# Patient Record
Sex: MALE | Race: WHITE | NOT HISPANIC OR LATINO
[De-identification: names, ages, dates, MRNs, and addresses within clinical notes are randomized per-mention and may not be internally consistent; named-entity substitution may affect disease eponyms.]

---

## 2017-01-12 ENCOUNTER — APPOINTMENT (OUTPATIENT)
Dept: CARDIOLOGY | Facility: CLINIC | Age: 57
End: 2017-01-12

## 2017-01-17 ENCOUNTER — APPOINTMENT (OUTPATIENT)
Dept: CARDIOLOGY | Facility: CLINIC | Age: 57
End: 2017-01-17

## 2017-05-16 ENCOUNTER — APPOINTMENT (OUTPATIENT)
Dept: CARDIOLOGY | Facility: CLINIC | Age: 57
End: 2017-05-16

## 2017-07-18 ENCOUNTER — APPOINTMENT (OUTPATIENT)
Dept: CARDIOLOGY | Facility: CLINIC | Age: 57
End: 2017-07-18

## 2017-11-09 ENCOUNTER — MEDICATION RENEWAL (OUTPATIENT)
Age: 57
End: 2017-11-09

## 2017-11-14 ENCOUNTER — MEDICATION RENEWAL (OUTPATIENT)
Age: 57
End: 2017-11-14

## 2017-11-15 ENCOUNTER — OTHER (OUTPATIENT)
Age: 57
End: 2017-11-15

## 2017-11-17 ENCOUNTER — RX RENEWAL (OUTPATIENT)
Age: 57
End: 2017-11-17

## 2017-12-01 ENCOUNTER — RX RENEWAL (OUTPATIENT)
Age: 57
End: 2017-12-01

## 2017-12-06 ENCOUNTER — APPOINTMENT (OUTPATIENT)
Dept: CARDIOLOGY | Facility: CLINIC | Age: 57
End: 2017-12-06
Payer: COMMERCIAL

## 2017-12-06 VITALS
RESPIRATION RATE: 18 BRPM | BODY MASS INDEX: 30.52 KG/M2 | WEIGHT: 218 LBS | DIASTOLIC BLOOD PRESSURE: 80 MMHG | SYSTOLIC BLOOD PRESSURE: 120 MMHG | HEART RATE: 84 BPM | OXYGEN SATURATION: 98 % | HEIGHT: 71 IN

## 2017-12-06 PROCEDURE — 99214 OFFICE O/P EST MOD 30 MIN: CPT

## 2018-01-17 ENCOUNTER — NON-APPOINTMENT (OUTPATIENT)
Age: 58
End: 2018-01-17

## 2018-01-17 ENCOUNTER — APPOINTMENT (OUTPATIENT)
Dept: CARDIOLOGY | Facility: CLINIC | Age: 58
End: 2018-01-17
Payer: COMMERCIAL

## 2018-01-17 VITALS
WEIGHT: 228 LBS | HEART RATE: 92 BPM | DIASTOLIC BLOOD PRESSURE: 78 MMHG | BODY MASS INDEX: 31.92 KG/M2 | SYSTOLIC BLOOD PRESSURE: 118 MMHG | HEIGHT: 71 IN

## 2018-01-17 PROCEDURE — 93000 ELECTROCARDIOGRAM COMPLETE: CPT

## 2018-01-17 PROCEDURE — 99214 OFFICE O/P EST MOD 30 MIN: CPT

## 2018-01-25 ENCOUNTER — RX RENEWAL (OUTPATIENT)
Age: 58
End: 2018-01-25

## 2018-01-26 ENCOUNTER — RX RENEWAL (OUTPATIENT)
Age: 58
End: 2018-01-26

## 2018-01-26 ENCOUNTER — RECORD ABSTRACTING (OUTPATIENT)
Age: 58
End: 2018-01-26

## 2018-01-26 DIAGNOSIS — Z87.09 PERSONAL HISTORY OF OTHER DISEASES OF THE RESPIRATORY SYSTEM: ICD-10-CM

## 2018-01-26 DIAGNOSIS — Z86.39 PERSONAL HISTORY OF OTHER ENDOCRINE, NUTRITIONAL AND METABOLIC DISEASE: ICD-10-CM

## 2018-01-26 DIAGNOSIS — Z86.19 PERSONAL HISTORY OF OTHER INFECTIOUS AND PARASITIC DISEASES: ICD-10-CM

## 2018-01-26 DIAGNOSIS — R73.03 PREDIABETES.: ICD-10-CM

## 2018-01-26 DIAGNOSIS — F17.200 NICOTINE DEPENDENCE, UNSPECIFIED, UNCOMPLICATED: ICD-10-CM

## 2018-01-26 DIAGNOSIS — Z86.79 PERSONAL HISTORY OF OTHER DISEASES OF THE CIRCULATORY SYSTEM: ICD-10-CM

## 2018-01-26 DIAGNOSIS — Z87.448 PERSONAL HISTORY OF OTHER DISEASES OF URINARY SYSTEM: ICD-10-CM

## 2018-01-26 DIAGNOSIS — Z87.898 PERSONAL HISTORY OF OTHER SPECIFIED CONDITIONS: ICD-10-CM

## 2018-01-26 DIAGNOSIS — Z98.890 OTHER SPECIFIED POSTPROCEDURAL STATES: ICD-10-CM

## 2018-01-26 DIAGNOSIS — Z82.49 FAMILY HISTORY OF ISCHEMIC HEART DISEASE AND OTHER DISEASES OF THE CIRCULATORY SYSTEM: ICD-10-CM

## 2018-02-06 ENCOUNTER — APPOINTMENT (OUTPATIENT)
Dept: CARDIOLOGY | Facility: CLINIC | Age: 58
End: 2018-02-06
Payer: COMMERCIAL

## 2018-02-06 PROCEDURE — 93306 TTE W/DOPPLER COMPLETE: CPT

## 2018-02-06 PROCEDURE — A9502: CPT

## 2018-02-06 PROCEDURE — 93015 CV STRESS TEST SUPVJ I&R: CPT

## 2018-02-06 PROCEDURE — 78452 HT MUSCLE IMAGE SPECT MULT: CPT

## 2018-02-13 ENCOUNTER — APPOINTMENT (OUTPATIENT)
Dept: CARDIOLOGY | Facility: CLINIC | Age: 58
End: 2018-02-13
Payer: COMMERCIAL

## 2018-02-13 VITALS
DIASTOLIC BLOOD PRESSURE: 84 MMHG | WEIGHT: 220 LBS | HEART RATE: 98 BPM | SYSTOLIC BLOOD PRESSURE: 140 MMHG | HEIGHT: 71 IN | BODY MASS INDEX: 30.8 KG/M2

## 2018-02-13 PROCEDURE — 99214 OFFICE O/P EST MOD 30 MIN: CPT

## 2018-02-27 ENCOUNTER — RX RENEWAL (OUTPATIENT)
Age: 58
End: 2018-02-27

## 2018-02-28 ENCOUNTER — RX RENEWAL (OUTPATIENT)
Age: 58
End: 2018-02-28

## 2018-03-06 ENCOUNTER — RX RENEWAL (OUTPATIENT)
Age: 58
End: 2018-03-06

## 2018-03-07 ENCOUNTER — RX RENEWAL (OUTPATIENT)
Age: 58
End: 2018-03-07

## 2018-04-17 ENCOUNTER — RX RENEWAL (OUTPATIENT)
Age: 58
End: 2018-04-17

## 2018-04-18 ENCOUNTER — RX RENEWAL (OUTPATIENT)
Age: 58
End: 2018-04-18

## 2018-04-23 ENCOUNTER — RX RENEWAL (OUTPATIENT)
Age: 58
End: 2018-04-23

## 2018-04-23 DIAGNOSIS — F32.9 MAJOR DEPRESSIVE DISORDER, SINGLE EPISODE, UNSPECIFIED: ICD-10-CM

## 2018-05-01 ENCOUNTER — APPOINTMENT (OUTPATIENT)
Dept: CARDIOLOGY | Facility: CLINIC | Age: 58
End: 2018-05-01
Payer: COMMERCIAL

## 2018-05-01 ENCOUNTER — NON-APPOINTMENT (OUTPATIENT)
Age: 58
End: 2018-05-01

## 2018-05-01 VITALS
RESPIRATION RATE: 16 BRPM | HEIGHT: 71 IN | WEIGHT: 229 LBS | OXYGEN SATURATION: 97 % | SYSTOLIC BLOOD PRESSURE: 98 MMHG | BODY MASS INDEX: 32.06 KG/M2 | DIASTOLIC BLOOD PRESSURE: 70 MMHG | HEART RATE: 86 BPM

## 2018-05-01 PROCEDURE — 99214 OFFICE O/P EST MOD 30 MIN: CPT

## 2018-05-01 PROCEDURE — 93000 ELECTROCARDIOGRAM COMPLETE: CPT

## 2018-05-15 ENCOUNTER — MEDICATION RENEWAL (OUTPATIENT)
Age: 58
End: 2018-05-15

## 2018-05-30 ENCOUNTER — APPOINTMENT (OUTPATIENT)
Dept: CARDIOLOGY | Facility: CLINIC | Age: 58
End: 2018-05-30

## 2018-06-05 ENCOUNTER — APPOINTMENT (OUTPATIENT)
Dept: CARDIOLOGY | Facility: CLINIC | Age: 58
End: 2018-06-05

## 2018-07-18 ENCOUNTER — APPOINTMENT (OUTPATIENT)
Dept: CARDIOLOGY | Facility: CLINIC | Age: 58
End: 2018-07-18

## 2018-07-18 ENCOUNTER — MEDICATION RENEWAL (OUTPATIENT)
Age: 58
End: 2018-07-18

## 2018-07-18 ENCOUNTER — OUTPATIENT (OUTPATIENT)
Dept: OUTPATIENT SERVICES | Facility: HOSPITAL | Age: 58
LOS: 1 days | End: 2018-07-18

## 2018-07-18 ENCOUNTER — RX RENEWAL (OUTPATIENT)
Age: 58
End: 2018-07-18

## 2018-08-06 ENCOUNTER — CLINICAL ADVICE (OUTPATIENT)
Age: 58
End: 2018-08-06

## 2018-10-22 ENCOUNTER — RX RENEWAL (OUTPATIENT)
Age: 58
End: 2018-10-22

## 2018-10-23 ENCOUNTER — RX RENEWAL (OUTPATIENT)
Age: 58
End: 2018-10-23

## 2018-11-12 ENCOUNTER — MEDICATION RENEWAL (OUTPATIENT)
Age: 58
End: 2018-11-12

## 2018-11-12 ENCOUNTER — CHART COPY (OUTPATIENT)
Age: 58
End: 2018-11-12

## 2018-12-26 ENCOUNTER — APPOINTMENT (OUTPATIENT)
Dept: CARDIOLOGY | Facility: CLINIC | Age: 58
End: 2018-12-26
Payer: COMMERCIAL

## 2018-12-26 VITALS
BODY MASS INDEX: 30.24 KG/M2 | DIASTOLIC BLOOD PRESSURE: 72 MMHG | SYSTOLIC BLOOD PRESSURE: 112 MMHG | HEART RATE: 78 BPM | HEIGHT: 71 IN | WEIGHT: 216 LBS | OXYGEN SATURATION: 93 %

## 2018-12-26 PROCEDURE — 99214 OFFICE O/P EST MOD 30 MIN: CPT

## 2018-12-26 NOTE — HISTORY OF PRESENT ILLNESS
[FreeTextEntry1] : CAD: The patient has good exercise ability without exertional symptoms.\par \par Hyperlipidemia: The patient is tolerating statin therapy without difficulty.\par \par Hypertension: Well controlled.\par \par Aortic aneurysm: The patient descending aorta is stable over quite some time at approximately 48 mm. Will require repeat imaging in the future.

## 2018-12-26 NOTE — PHYSICAL EXAM
[General Appearance - Well Developed] : well developed [Normal Appearance] : normal appearance [Well Groomed] : well groomed [General Appearance - Well Nourished] : well nourished [No Deformities] : no deformities [General Appearance - In No Acute Distress] : no acute distress [Normal Conjunctiva] : the conjunctiva exhibited no abnormalities [Eyelids - No Xanthelasma] : the eyelids demonstrated no xanthelasmas [Normal Oral Mucosa] : normal oral mucosa [No Oral Pallor] : no oral pallor [No Oral Cyanosis] : no oral cyanosis [Normal Jugular Venous A Waves Present] : normal jugular venous A waves present [Normal Jugular Venous V Waves Present] : normal jugular venous V waves present [No Jugular Venous Trent A Waves] : no jugular venous trent A waves [] : no respiratory distress [Respiration, Rhythm And Depth] : normal respiratory rhythm and effort [Exaggerated Use Of Accessory Muscles For Inspiration] : no accessory muscle use [Auscultation Breath Sounds / Voice Sounds] : lungs were clear to auscultation bilaterally [Heart Rate And Rhythm] : heart rate and rhythm were normal [Heart Sounds] : normal S1 and S2 [Murmurs] : no murmurs present

## 2019-01-21 ENCOUNTER — MEDICATION RENEWAL (OUTPATIENT)
Age: 59
End: 2019-01-21

## 2019-01-28 ENCOUNTER — MEDICATION RENEWAL (OUTPATIENT)
Age: 59
End: 2019-01-28

## 2019-02-12 ENCOUNTER — RX RENEWAL (OUTPATIENT)
Age: 59
End: 2019-02-12

## 2019-04-24 ENCOUNTER — RX RENEWAL (OUTPATIENT)
Age: 59
End: 2019-04-24

## 2019-04-30 ENCOUNTER — APPOINTMENT (OUTPATIENT)
Dept: CARDIOLOGY | Facility: CLINIC | Age: 59
End: 2019-04-30
Payer: COMMERCIAL

## 2019-04-30 ENCOUNTER — NON-APPOINTMENT (OUTPATIENT)
Age: 59
End: 2019-04-30

## 2019-04-30 VITALS
HEART RATE: 80 BPM | BODY MASS INDEX: 30.68 KG/M2 | WEIGHT: 220 LBS | DIASTOLIC BLOOD PRESSURE: 82 MMHG | SYSTOLIC BLOOD PRESSURE: 128 MMHG

## 2019-04-30 PROCEDURE — 99214 OFFICE O/P EST MOD 30 MIN: CPT

## 2019-04-30 PROCEDURE — 93000 ELECTROCARDIOGRAM COMPLETE: CPT

## 2019-04-30 NOTE — ASSESSMENT
[FreeTextEntry1] : CAD: The patient is status post coronary stenting. The patient has good exercise ability without exertional symptoms.\par \par Myocardial infarction: Continue beta blockade.\par \par Aortic thoracic aneurysm: The patient had stable dimensions from 2016 2018. Consider repeat imaging in the future.\par \par HTN:  well controlled

## 2019-04-30 NOTE — HISTORY OF PRESENT ILLNESS
[Scheduled Procedure ___] : a [unfilled] [FreeTextEntry1] : CAD: The patient is status post coronary stenting. The patient has good exercise ability without exertional symptoms.\par \par Myocardial infarction: Continue beta blockade.\par \par Aortic thoracic aneurysm: The patient had stable dimensions from 2016 2018. Consider repeat imaging in the future.\par \par HTN:  well controlled

## 2019-04-30 NOTE — DISCUSSION/SUMMARY
[Optimized for Surgery] : the patient is optimized for surgery [FreeTextEntry1] : Pre Operative Issues:\par \par The patient is maximized for the planned procedure.\par \par Proceed without delay.\par \par Keep input equal to output.\par \par Standard DVT prophylaxis is recommended.\par \par Hold plavix for five days pre op and resume on POD number 2 if there is no bleeding.\par \par Continue ASA through procedure without interruption.\par \par Baseline meds.

## 2019-05-06 ENCOUNTER — RX RENEWAL (OUTPATIENT)
Age: 59
End: 2019-05-06

## 2019-05-14 ENCOUNTER — APPOINTMENT (OUTPATIENT)
Dept: CARDIOLOGY | Facility: CLINIC | Age: 59
End: 2019-05-14

## 2019-05-14 ENCOUNTER — APPOINTMENT (OUTPATIENT)
Dept: CARDIOLOGY | Facility: CLINIC | Age: 59
End: 2019-05-14
Payer: COMMERCIAL

## 2019-05-14 VITALS
HEIGHT: 71 IN | OXYGEN SATURATION: 96 % | DIASTOLIC BLOOD PRESSURE: 82 MMHG | SYSTOLIC BLOOD PRESSURE: 122 MMHG | WEIGHT: 223 LBS | BODY MASS INDEX: 31.22 KG/M2 | HEART RATE: 92 BPM

## 2019-05-14 PROCEDURE — 99214 OFFICE O/P EST MOD 30 MIN: CPT

## 2019-05-14 NOTE — PHYSICAL EXAM
[General Appearance - Well Developed] : well developed [Normal Appearance] : normal appearance [No Deformities] : no deformities [General Appearance - Well Nourished] : well nourished [Well Groomed] : well groomed [Eyelids - No Xanthelasma] : the eyelids demonstrated no xanthelasmas [Normal Conjunctiva] : the conjunctiva exhibited no abnormalities [General Appearance - In No Acute Distress] : no acute distress [Normal Oral Mucosa] : normal oral mucosa [No Oral Pallor] : no oral pallor [No Oral Cyanosis] : no oral cyanosis [Normal Jugular Venous A Waves Present] : normal jugular venous A waves present [Normal Jugular Venous V Waves Present] : normal jugular venous V waves present [Respiration, Rhythm And Depth] : normal respiratory rhythm and effort [No Jugular Venous Trent A Waves] : no jugular venous trent A waves [Auscultation Breath Sounds / Voice Sounds] : lungs were clear to auscultation bilaterally [Exaggerated Use Of Accessory Muscles For Inspiration] : no accessory muscle use [Heart Sounds] : normal S1 and S2 [Heart Rate And Rhythm] : heart rate and rhythm were normal [Abdomen Soft] : soft [Murmurs] : no murmurs present [Abdomen Tenderness] : non-tender [Abdomen Mass (___ Cm)] : no abdominal mass palpated [] : no hepato-splenomegaly [Gait - Sufficient For Exercise Testing] : the gait was sufficient for exercise testing [Abnormal Walk] : normal gait

## 2019-05-14 NOTE — ASSESSMENT
[FreeTextEntry1] : Recent admission to the hospital with symptoms likely secondary to drugs given postop. He clinically improved. He was given intravenous fluids. There was no evidence of acute coronary syndrome. He denies any chest pains. He has a history of slightly enlarged ascending aorta. Echocardiogram will be scheduled this year at 12 and a progression of aortic aneurysm. Continue present medications.\par CAD stable. Continue present medications. Followup of the echocardiogram.

## 2019-05-14 NOTE — HISTORY OF PRESENT ILLNESS
[FreeTextEntry1] : Patient has a history of CAD. Patient has a history of myocardial infarct in the past. Patient has a history of drug-eluting stent was to LAD. Left cardiac catheterization in December 2018 revealed widely patent stents. Patient had recent admission to the hospital with symptoms of nausea. Patient is 59-year-old male with a history of asthma, hypertension, hyperlipidemia, chronic renal insufficiency. Patient has chronic lower back pain with spinal stenosis. He should undergo endoscopic sinus surgery. He received a large amount of medications in PACU once he was outside of the hospital he developed significant nausea lightheadedness which prompted him to return to the hospital. He was admitted for observation. He was given intravenous fluids. His symptoms improved.

## 2019-05-29 ENCOUNTER — APPOINTMENT (OUTPATIENT)
Dept: CARDIOLOGY | Facility: CLINIC | Age: 59
End: 2019-05-29
Payer: COMMERCIAL

## 2019-06-04 ENCOUNTER — APPOINTMENT (OUTPATIENT)
Dept: CARDIOLOGY | Facility: CLINIC | Age: 59
End: 2019-06-04
Payer: COMMERCIAL

## 2019-06-04 VITALS
SYSTOLIC BLOOD PRESSURE: 124 MMHG | BODY MASS INDEX: 31.64 KG/M2 | DIASTOLIC BLOOD PRESSURE: 72 MMHG | HEART RATE: 90 BPM | OXYGEN SATURATION: 96 % | WEIGHT: 226 LBS | HEIGHT: 71 IN

## 2019-06-04 PROCEDURE — 99214 OFFICE O/P EST MOD 30 MIN: CPT

## 2019-06-04 PROCEDURE — 93306 TTE W/DOPPLER COMPLETE: CPT

## 2019-06-04 NOTE — PHYSICAL EXAM
[Normal Appearance] : normal appearance [General Appearance - Well Developed] : well developed [Well Groomed] : well groomed [General Appearance - Well Nourished] : well nourished [No Deformities] : no deformities [General Appearance - In No Acute Distress] : no acute distress [Normal Conjunctiva] : the conjunctiva exhibited no abnormalities [Eyelids - No Xanthelasma] : the eyelids demonstrated no xanthelasmas [Normal Oral Mucosa] : normal oral mucosa [No Oral Pallor] : no oral pallor [No Oral Cyanosis] : no oral cyanosis [Normal Jugular Venous A Waves Present] : normal jugular venous A waves present [Normal Jugular Venous V Waves Present] : normal jugular venous V waves present [No Jugular Venous Trent A Waves] : no jugular venous trent A waves [Respiration, Rhythm And Depth] : normal respiratory rhythm and effort [Auscultation Breath Sounds / Voice Sounds] : lungs were clear to auscultation bilaterally [Exaggerated Use Of Accessory Muscles For Inspiration] : no accessory muscle use [Heart Rate And Rhythm] : heart rate and rhythm were normal [Heart Sounds] : normal S1 and S2 [Murmurs] : no murmurs present [Abdomen Soft] : soft [] : no hepato-splenomegaly [Abdomen Tenderness] : non-tender [Abdomen Mass (___ Cm)] : no abdominal mass palpated [Gait - Sufficient For Exercise Testing] : the gait was sufficient for exercise testing [Abnormal Walk] : normal gait

## 2019-06-04 NOTE — HISTORY OF PRESENT ILLNESS
[FreeTextEntry1] : Patient has a history of CAD. Patient has a history of myocardial infarct in the past. Patient has a history of drug-eluting stent was to LAD. Left cardiac catheterization in December 2018 revealed widely patent stents. Patient had recent admission to the hospital with symptoms of nausea. Patient is 59-year-old male with a history of asthma, hypertension, hyperlipidemia, chronic renal insufficiency. Patient has chronic lower back pain with spinal stenosis. He had recent endoscopic sinus surgery. He received a large amount of medications in PACU once he was outside of the hospital he developed significant nausea lightheadedness which prompted him to return to the hospital. He was admitted for observation. He was given intravenous fluids. His symptoms improved.

## 2019-06-04 NOTE — ASSESSMENT
[FreeTextEntry1] : Recent admission to the hospital with symptoms likely secondary to drugs given postop. He clinically improved. He was given intravenous fluids. There was no evidence of acute coronary syndrome. He denies any chest pains. He has a history of slightly enlarged ascending aorta. Echocardiogram Done today was reviewed. Ascending aorta a stable measuring 4.0 cm. Normal ejection fraction. We'll be following her echocardiogram healing.\par CAD stable. No symptoms of angina. Continue present medications. Cardiac followup yearly and as needed.

## 2019-06-06 ENCOUNTER — APPOINTMENT (OUTPATIENT)
Dept: FAMILY MEDICINE | Facility: CLINIC | Age: 59
End: 2019-06-06
Payer: OTHER MISCELLANEOUS

## 2019-06-06 VITALS
TEMPERATURE: 98.4 F | OXYGEN SATURATION: 96 % | HEART RATE: 104 BPM | SYSTOLIC BLOOD PRESSURE: 140 MMHG | RESPIRATION RATE: 15 BRPM | DIASTOLIC BLOOD PRESSURE: 80 MMHG | WEIGHT: 230 LBS | HEIGHT: 71 IN | BODY MASS INDEX: 32.2 KG/M2

## 2019-06-06 DIAGNOSIS — M62.830 MUSCLE SPASM OF BACK: ICD-10-CM

## 2019-06-06 PROCEDURE — 99204 OFFICE O/P NEW MOD 45 MIN: CPT

## 2019-06-06 NOTE — PHYSICAL EXAM
[No Acute Distress] : no acute distress [de-identified] : ropy rt paralumbar tender to palp limited eval as pain increased by movement ROM testing deferred

## 2019-06-06 NOTE — HEALTH RISK ASSESSMENT
[Intercurrent ED visits] : went to ED [1] : 2) Feeling down, depressed, or hopeless for several days (1) [0] : 1) Little interest or pleasure doing things: Not at all (0) [] : No [de-identified] : back injury [de-identified] : Pain management (past lower back injury) patient was approved for medical marijuana [de-identified] : gym, stretching exercises  [de-identified] : "healthy diet"/ high protein  [[id number]] : 1

## 2019-06-06 NOTE — HISTORY OF PRESENT ILLNESS
[FreeTextEntry1] : (Workers comp only- not establishing PCP) Patient already has PCP- Paul [place] ([facility])\par New patient; new worker's comp case \par Patient states that he slipped from something slippery on the floor at work 5/17/19\par patient has been in pain since the incident; he had been working for a few days after, but had to stop working 6/2/19 due to extreme pain\par Patient went to ER 6/2 pt states that the doctor believes it is a muscle tear  [de-identified] : ER June 2nd \par work rel;ated injury\par near slip and fall acute muscle spasm back and flank\par persists rest helps some ER eval muscle relaxer helps some\par

## 2019-07-11 ENCOUNTER — APPOINTMENT (OUTPATIENT)
Dept: FAMILY MEDICINE | Facility: CLINIC | Age: 59
End: 2019-07-11
Payer: OTHER MISCELLANEOUS

## 2019-07-11 VITALS
HEART RATE: 91 BPM | WEIGHT: 224 LBS | DIASTOLIC BLOOD PRESSURE: 84 MMHG | BODY MASS INDEX: 31.36 KG/M2 | TEMPERATURE: 97.7 F | RESPIRATION RATE: 16 BRPM | SYSTOLIC BLOOD PRESSURE: 124 MMHG | OXYGEN SATURATION: 96 % | HEIGHT: 71 IN

## 2019-07-11 DIAGNOSIS — Z02.6 ENCOUNTER FOR EXAMINATION FOR INSURANCE PURPOSES: ICD-10-CM

## 2019-07-11 PROCEDURE — 99214 OFFICE O/P EST MOD 30 MIN: CPT

## 2019-07-11 NOTE — PHYSICAL EXAM
[No Acute Distress] : no acute distress [No Spinal Tenderness] : no spinal tenderness [No Focal Deficits] : no focal deficits [Grossly Normal Strength/Tone] : grossly normal strength/tone [de-identified] : hip flexion noted fingers to toes  [Normal Gait] : normal gait

## 2019-07-11 NOTE — HEALTH RISK ASSESSMENT
[Yes] : Yes [2 - 4 times a month (2 pts)] : 2-4 times a month (2 points) [1 or 2 (0 pts)] : 1 or 2 (0 points) [Never (0 pts)] : Never (0 points) [No] : In the past 12 months have you used drugs other than those required for medical reasons? No [3] : 2) Feeling down, depressed, or hopeless for nearly every day (3) [] : No [[id number]] : 6

## 2019-07-11 NOTE — HISTORY OF PRESENT ILLNESS
[FreeTextEntry1] : workers comp \par [employer] in [place] \par cc: neck pain\par  [de-identified] : hx neck and back pain disc disease recent work related injury back pain sprain spasm improved plan rtw Sunday\par daily stretch exercise and pt rehab \par depressed mood post injury as pain increased reviewed meds denies suicidal ideation current \par pain neck severe at times \par

## 2019-07-15 ENCOUNTER — MEDICATION RENEWAL (OUTPATIENT)
Age: 59
End: 2019-07-15

## 2019-07-18 ENCOUNTER — MEDICATION RENEWAL (OUTPATIENT)
Age: 59
End: 2019-07-18

## 2019-08-01 ENCOUNTER — APPOINTMENT (OUTPATIENT)
Dept: FAMILY MEDICINE | Facility: CLINIC | Age: 59
End: 2019-08-01
Payer: OTHER MISCELLANEOUS

## 2019-08-01 VITALS
SYSTOLIC BLOOD PRESSURE: 132 MMHG | WEIGHT: 225 LBS | DIASTOLIC BLOOD PRESSURE: 78 MMHG | TEMPERATURE: 98.1 F | RESPIRATION RATE: 16 BRPM | BODY MASS INDEX: 31.5 KG/M2 | OXYGEN SATURATION: 96 % | HEIGHT: 71 IN | HEART RATE: 88 BPM

## 2019-08-01 DIAGNOSIS — F32.9 MAJOR DEPRESSIVE DISORDER, SINGLE EPISODE, UNSPECIFIED: ICD-10-CM

## 2019-08-01 PROCEDURE — 99214 OFFICE O/P EST MOD 30 MIN: CPT

## 2019-08-01 NOTE — HISTORY OF PRESENT ILLNESS
[FreeTextEntry1] : pt here W/C follow up \par states hes still presenting pain on the right side to back \par had a cat scan of abdominal mid section 7/24 with  \par schedule for surgery 8/9 has a hernia  [de-identified] : right sided pain near bone pelvis twisting jolt injury work\par pain on rtight side superior ant post iliac crest \par scheduled for abdominal wall surgery umbilical date next friday \par pain back only lying on right side\par alert nad ros as above plus neck pain neck spray biofreeze helps some\par depressed but more stable\par \par

## 2019-08-01 NOTE — COUNSELING
[Behavioral health counseling provided] : Behavioral health counseling provided [de-identified] : behavioral health counceling scheduled

## 2019-08-02 ENCOUNTER — APPOINTMENT (OUTPATIENT)
Dept: CARDIOLOGY | Facility: CLINIC | Age: 59
End: 2019-08-02

## 2019-08-06 ENCOUNTER — APPOINTMENT (OUTPATIENT)
Dept: CARDIOLOGY | Facility: CLINIC | Age: 59
End: 2019-08-06
Payer: COMMERCIAL

## 2019-08-06 VITALS
OXYGEN SATURATION: 95 % | DIASTOLIC BLOOD PRESSURE: 64 MMHG | WEIGHT: 224 LBS | BODY MASS INDEX: 31.24 KG/M2 | HEART RATE: 91 BPM | SYSTOLIC BLOOD PRESSURE: 110 MMHG

## 2019-08-06 PROCEDURE — 99214 OFFICE O/P EST MOD 30 MIN: CPT

## 2019-08-06 RX ORDER — ALBUTEROL SULFATE 90 UG/1
108 (90 BASE) INHALANT RESPIRATORY (INHALATION)
Refills: 0 | Status: ACTIVE | COMMUNITY

## 2019-08-06 NOTE — PHYSICAL EXAM
[General Appearance - Well Developed] : well developed [Normal Appearance] : normal appearance [Well Groomed] : well groomed [No Deformities] : no deformities [General Appearance - Well Nourished] : well nourished [General Appearance - In No Acute Distress] : no acute distress [No Oral Pallor] : no oral pallor [No Oral Cyanosis] : no oral cyanosis [Normal Jugular Venous V Waves Present] : normal jugular venous V waves present [Normal Jugular Venous A Waves Present] : normal jugular venous A waves present [No Jugular Venous Trent A Waves] : no jugular venous trent A waves [Exaggerated Use Of Accessory Muscles For Inspiration] : no accessory muscle use [Respiration, Rhythm And Depth] : normal respiratory rhythm and effort [Heart Rate And Rhythm] : heart rate and rhythm were normal [Auscultation Breath Sounds / Voice Sounds] : lungs were clear to auscultation bilaterally [Heart Sounds] : normal S1 and S2 [Murmurs] : no murmurs present [Abdomen Soft] : soft [Edema] : no peripheral edema present [Abdomen Tenderness] : non-tender [Abnormal Walk] : normal gait [Gait - Sufficient For Exercise Testing] : the gait was sufficient for exercise testing [Cyanosis, Localized] : no localized cyanosis [Nail Clubbing] : no clubbing of the fingernails [Petechial Hemorrhages (___cm)] : no petechial hemorrhages [Skin Color & Pigmentation] : normal skin color and pigmentation [] : no rash [No Venous Stasis] : no venous stasis [Skin Lesions] : no skin lesions [No Skin Ulcers] : no skin ulcer [No Xanthoma] : no  xanthoma was observed [Affect] : the affect was normal [Oriented To Time, Place, And Person] : oriented to person, place, and time [Mood] : the mood was normal [No Anxiety] : not feeling anxious

## 2019-08-06 NOTE — CARDIOLOGY SUMMARY
[___] : [unfilled] [LVEF ___%] : LVEF [unfilled]% [Normal] : normal LA size [None] : no pulmonary hypertension [Mild] : mild mitral regurgitation

## 2019-08-13 ENCOUNTER — RX RENEWAL (OUTPATIENT)
Age: 59
End: 2019-08-13

## 2019-09-03 ENCOUNTER — APPOINTMENT (OUTPATIENT)
Dept: FAMILY MEDICINE | Facility: CLINIC | Age: 59
End: 2019-09-03

## 2019-10-24 NOTE — ASSESSMENT
[FreeTextEntry1] : HECTOR BONILLA is a 59 year old M who presents today Aug 06, 2019 with the above history and the following active issues: \par \par CAD: History of multiple PCI most recent Jan 2017. Cath in Dec 2018 revealed widely patent LAD stent and nonobstructive disease. He has remained on optimal medical management with no change in functional status and no recent anginal complaints. Recent echo revealed normal LV systolic function, EF 60%. His EKG is unchanged. No further testing indicated at this time. Cont antiplatelet, statin, nitrate, and beta blocker therapy. He is aware of exertional symptoms which would warrant further investigation. \par \par HTN: Well controlled on current regimen. Continue losartan, and metoprolol. Low salt diet. \par \par HLD: Tolerating statin well. Continue crestor current dose. Adjunctive lifestyle modification measures.\par \par TAA: 4.8cm, stable according to recent CT scan. F/U with vascular for surveillance monitoring.\par \par Preoperative cardiovascular examination: Lap Hernia Repair\par At present, there are no active cardiac conditions. \par No recent unstable coronary syndromes, decompensated heart failure, severe valvular heart disease or significant dysrhythmias.  \par Baseline functional status is acceptable.    \par The clinical benefit of the proposed procedure outweighs the associated cardiovascular risk.  \par Risk not attenuated with further CV testing.  \par Optimized from a cardiovascular perspective.\par I discussed with him the risk of stent thrombosis while off antiplatelet therapy and the need for lifelong, uninterrupted ASA unless otherwise indicated. \par He may remain off plavix 5-7 days prior to procedure and restart as soon as hemodynamically stable postop. \par However, prefer he remain on ASA 81mg daily throughout the perioperative period. \par Continue beta blocker\par DVT ppx\par Even fluid balance \par \par Please do not hesitate to call for any questions or concerns. \par \par Sincerely,\par \par ROMY Castro\par Patients history, testing, and plan reviewed with supervising MD: Dr. Farshad Leung

## 2019-10-24 NOTE — HISTORY OF PRESENT ILLNESS
[Preoperative Visit] : for a medical evaluation prior to surgery [Scheduled Procedure ___] : a [unfilled] [Date of Surgery ___] : on [unfilled] [Surgeon Name ___] : surgeon: [unfilled] [Good] : Good [Stable] : Stable [FreeTextEntry1] : \par 59 M with PMH of CAD, MI in 2014, s/p multiple PCI most recently January 2017. Cardiac cath in Dec 2018 revealed widely patent stent and nonobstructive CAD elsewhere. Preserved LVEF, no history of CHF. Other PMH of HTN, HLD, TAA (stable, 4.8cm ascending, last CT 4/2019), and smoking. No history of diabetes or cerebrovascular events. \par \par He had hernia surgery in the past. Denies any intraoperative complications. Requires another hernia procedure. \par \par Since last seen there has been no hospitalizations. No changes in his functional status. He is able to climb at least 2 flights of stairs without any issues. Denies exertional chest pain or discomfort. Denies unusual shortness of breath, orthopnea, weight gain, or LE edema. Denies palpitations, lightheadedness, dizziness, or syncope.  Denies any unusual bleeding or black/tarry stools. \par \par

## 2019-10-24 NOTE — ADDENDUM
[FreeTextEntry1] : Please note the patient was reviewed with the PA.\par I was physically present during the service of the patient\par I was directly involved in the management plan and recommendations of care provided to the patient. \par I personally reviewed the history and physical exam and plan as documented by the PA above.\par \par Farshad Leung, DO, FACC, RPVI\par Cardiologist\par 8/6/2019\par \par \par \par Addendum 10/24/19:\par Dr. Carr, dentist office called for clearance for root canal. Pt is optimized to proceed with planned procedure from a cardiac standpoint. Continue ASA. May hold plavix at the discretion of Dr. Carr if necessary. No SBE prophylaxis required. Minimize use of epinephrine.

## 2019-11-11 ENCOUNTER — NON-APPOINTMENT (OUTPATIENT)
Age: 59
End: 2019-11-11

## 2019-11-11 ENCOUNTER — APPOINTMENT (OUTPATIENT)
Dept: CARDIOLOGY | Facility: CLINIC | Age: 59
End: 2019-11-11
Payer: COMMERCIAL

## 2019-11-11 VITALS
WEIGHT: 200 LBS | HEART RATE: 108 BPM | HEIGHT: 71 IN | DIASTOLIC BLOOD PRESSURE: 68 MMHG | BODY MASS INDEX: 28 KG/M2 | OXYGEN SATURATION: 98 % | SYSTOLIC BLOOD PRESSURE: 108 MMHG

## 2019-11-11 DIAGNOSIS — B00.9 HERPESVIRAL INFECTION, UNSPECIFIED: ICD-10-CM

## 2019-11-11 PROCEDURE — 99214 OFFICE O/P EST MOD 30 MIN: CPT

## 2019-11-11 PROCEDURE — 93000 ELECTROCARDIOGRAM COMPLETE: CPT

## 2019-11-11 RX ORDER — OMEPRAZOLE 40 MG/1
40 CAPSULE, DELAYED RELEASE ORAL TWICE DAILY
Refills: 0 | Status: DISCONTINUED | COMMUNITY
End: 2019-11-11

## 2019-11-11 RX ORDER — VALACYCLOVIR HYDROCHLORIDE 500 MG/1
500 TABLET, FILM COATED ORAL DAILY
Refills: 0 | Status: ACTIVE | COMMUNITY

## 2019-11-11 NOTE — PHYSICAL EXAM
[Normal Appearance] : normal appearance [General Appearance - Well Developed] : well developed [General Appearance - Well Nourished] : well nourished [Well Groomed] : well groomed [No Deformities] : no deformities [General Appearance - In No Acute Distress] : no acute distress [Normal Conjunctiva] : the conjunctiva exhibited no abnormalities [Normal Oral Mucosa] : normal oral mucosa [Eyelids - No Xanthelasma] : the eyelids demonstrated no xanthelasmas [No Oral Cyanosis] : no oral cyanosis [No Oral Pallor] : no oral pallor [Normal Jugular Venous A Waves Present] : normal jugular venous A waves present [Normal Jugular Venous V Waves Present] : normal jugular venous V waves present [No Jugular Venous Trent A Waves] : no jugular venous trent A waves [Respiration, Rhythm And Depth] : normal respiratory rhythm and effort [] : no respiratory distress [Exaggerated Use Of Accessory Muscles For Inspiration] : no accessory muscle use [Heart Rate And Rhythm] : heart rate and rhythm were normal [Auscultation Breath Sounds / Voice Sounds] : lungs were clear to auscultation bilaterally [Murmurs] : no murmurs present [Heart Sounds] : normal S1 and S2

## 2019-11-11 NOTE — REASON FOR VISIT
[Follow-Up - Clinic] : a clinic follow-up of [Coronary Artery Disease] : coronary artery disease [FreeTextEntry1] : Prolonged hospital stay after GI surg.  In rehab, no exertional sx.  ELISABETH w normal LV fxn.\par \par CAD: Patient is status post myocardial infarction. Continue beta-blockade. Multiple PCI's the past. Recent invasive coronary angiography in December 2018 revealed widely patent stents and nonobstructive CAD elsewhere.\par \par Hypertension: Well controlled.\par \par Hyperlipidemia: Check LFTs and cholesterol profile.

## 2019-11-11 NOTE — ASSESSMENT
[FreeTextEntry1] : Prolonged hospital stay after GI surg.  In rehab, no exertional sx.  ELISABETH w normal LV fxn.\par \par CAD: Patient is status post myocardial infarction. Continue beta-blockade. Multiple PCI's the past. Recent invasive coronary angiography in December 2018 revealed widely patent stents and nonobstructive CAD elsewhere.\par \par Hypertension: Well controlled.\par \par Hyperlipidemia: Check LFTs and cholesterol profile.

## 2019-11-13 ENCOUNTER — RECORD ABSTRACTING (OUTPATIENT)
Age: 59
End: 2019-11-13

## 2019-12-17 ENCOUNTER — RECORD ABSTRACTING (OUTPATIENT)
Age: 59
End: 2019-12-17

## 2020-02-18 ENCOUNTER — APPOINTMENT (OUTPATIENT)
Dept: CARDIOLOGY | Facility: CLINIC | Age: 60
End: 2020-02-18

## 2020-03-24 ENCOUNTER — APPOINTMENT (OUTPATIENT)
Dept: CARDIOLOGY | Facility: CLINIC | Age: 60
End: 2020-03-24

## 2020-05-05 ENCOUNTER — APPOINTMENT (OUTPATIENT)
Dept: CARDIOLOGY | Facility: CLINIC | Age: 60
End: 2020-05-05

## 2020-05-05 VITALS
HEART RATE: 86 BPM | OXYGEN SATURATION: 96 % | BODY MASS INDEX: 33.6 KG/M2 | WEIGHT: 240 LBS | DIASTOLIC BLOOD PRESSURE: 76 MMHG | HEIGHT: 71 IN | SYSTOLIC BLOOD PRESSURE: 137 MMHG

## 2020-05-05 NOTE — HISTORY OF PRESENT ILLNESS
[FreeTextEntry1] : Time of initiation 10:15am [Home] : at home, [unfilled] , at the time of the visit. [Other Location: e.g. Home (Enter Location, City,State)___] : at [unfilled]

## 2020-05-06 ENCOUNTER — APPOINTMENT (OUTPATIENT)
Dept: CARDIOLOGY | Facility: CLINIC | Age: 60
End: 2020-05-06
Payer: MEDICAID

## 2020-05-06 VITALS
OXYGEN SATURATION: 94 % | HEART RATE: 90 BPM | HEIGHT: 71 IN | DIASTOLIC BLOOD PRESSURE: 50 MMHG | WEIGHT: 240 LBS | BODY MASS INDEX: 33.6 KG/M2 | SYSTOLIC BLOOD PRESSURE: 100 MMHG

## 2020-05-06 PROCEDURE — 99214 OFFICE O/P EST MOD 30 MIN: CPT

## 2020-05-06 NOTE — HISTORY OF PRESENT ILLNESS
[FreeTextEntry1] : CAD: The patient is status post coronary stenting.  Patient has good exercise ability without exertional symptoms.\par \par GERD: None recently.\par \par Hypertension: Well-controlled.\par \par Aortic aneurysm: Most recent imaging shows slightly reduced size.

## 2020-05-12 ENCOUNTER — APPOINTMENT (OUTPATIENT)
Dept: CARDIOLOGY | Facility: CLINIC | Age: 60
End: 2020-05-12

## 2020-08-06 ENCOUNTER — OUTPATIENT (OUTPATIENT)
Dept: OUTPATIENT SERVICES | Facility: HOSPITAL | Age: 60
LOS: 1 days | End: 2020-08-06
Payer: MEDICAID

## 2020-08-06 PROCEDURE — 74176 CT ABD & PELVIS W/O CONTRAST: CPT | Mod: 26

## 2020-09-08 ENCOUNTER — APPOINTMENT (OUTPATIENT)
Dept: CARDIOLOGY | Facility: CLINIC | Age: 60
End: 2020-09-08
Payer: MEDICAID

## 2020-09-08 ENCOUNTER — NON-APPOINTMENT (OUTPATIENT)
Age: 60
End: 2020-09-08

## 2020-09-08 VITALS
TEMPERATURE: 97 F | BODY MASS INDEX: 31.5 KG/M2 | OXYGEN SATURATION: 98 % | WEIGHT: 225 LBS | SYSTOLIC BLOOD PRESSURE: 118 MMHG | HEIGHT: 71 IN | HEART RATE: 96 BPM | DIASTOLIC BLOOD PRESSURE: 74 MMHG

## 2020-09-08 PROCEDURE — 99214 OFFICE O/P EST MOD 30 MIN: CPT

## 2020-09-08 PROCEDURE — 93000 ELECTROCARDIOGRAM COMPLETE: CPT

## 2020-09-08 NOTE — HISTORY OF PRESENT ILLNESS
[FreeTextEntry1] : CAD: The patient is status post coronary stenting.  The patient now presents with chest discomfort syndrome.  The patient describes a tight-like sensation.  It is similar to prior to his previous coronary stenting.  He has had multiple episodes.  Some episodes have occurred after exertion.  The patient has chronic shortness of breath since an abdominal procedure which resulted in sepsis.  The risks benefits and options with regards to invasive coronary angiography and possible intervention have been reviewed and understood. The risks have been explained as including but not limited to death stroke and heart attack. The various technologies involved have been reviewed. All questions have been answered. The patient verbalizes understanding. The patient wishes to proceed. \par \par  We will continue aspirin and Plavix.\par \par Previous attempts at radial approach to angiography have failed.  Recommend femoral approach.\par \par Hyperlipidemia: Continue statin therapy.\par \par Hypertension: Continue current medications.

## 2020-09-08 NOTE — ASSESSMENT
[FreeTextEntry1] : CAD: The patient is status post coronary stenting.  The patient now presents with chest discomfort syndrome.  The patient describes a tight-like sensation.  It is similar to prior to his previous coronary stenting.  He has had multiple episodes.  Some episodes have occurred after exertion.  The patient has chronic shortness of breath since an abdominal procedure which resulted in sepsis.  The risks benefits and options with regards to invasive coronary angiography and possible intervention have been reviewed and understood. The risks have been explained as including but not limited to death stroke and heart attack. The various technologies involved have been reviewed. All questions have been answered. The patient verbalizes understanding. The patient wishes to proceed. \par \par The patient has been advised to call 911 for any chest discomfort lasting over 15 minutes.\par \par  We will continue aspirin and Plavix.\par \par Previous attempts at radial approach to angiography have failed.  Recommend femoral approach.\par \par Hyperlipidemia: Continue statin therapy.\par \par Hypertension: Continue current medications.

## 2020-09-08 NOTE — PHYSICAL EXAM
[General Appearance - Well Developed] : well developed [Normal Appearance] : normal appearance [Well Groomed] : well groomed [General Appearance - Well Nourished] : well nourished [No Deformities] : no deformities [General Appearance - In No Acute Distress] : no acute distress [Normal Conjunctiva] : the conjunctiva exhibited no abnormalities [Eyelids - No Xanthelasma] : the eyelids demonstrated no xanthelasmas [Normal Oral Mucosa] : normal oral mucosa [No Oral Pallor] : no oral pallor [No Oral Cyanosis] : no oral cyanosis [Normal Jugular Venous A Waves Present] : normal jugular venous A waves present [Normal Jugular Venous V Waves Present] : normal jugular venous V waves present [No Jugular Venous Trent A Waves] : no jugular venous trent A waves [] : no respiratory distress [Respiration, Rhythm And Depth] : normal respiratory rhythm and effort [Exaggerated Use Of Accessory Muscles For Inspiration] : no accessory muscle use [Auscultation Breath Sounds / Voice Sounds] : lungs were clear to auscultation bilaterally [Heart Rate And Rhythm] : heart rate and rhythm were normal [Murmurs] : no murmurs present [Heart Sounds] : normal S1 and S2

## 2020-09-14 ENCOUNTER — APPOINTMENT (OUTPATIENT)
Dept: DISASTER EMERGENCY | Facility: CLINIC | Age: 60
End: 2020-09-14

## 2020-09-14 ENCOUNTER — OUTPATIENT (OUTPATIENT)
Dept: OUTPATIENT SERVICES | Facility: HOSPITAL | Age: 60
LOS: 1 days | End: 2020-09-14

## 2020-09-15 LAB — SARS-COV-2 N GENE NPH QL NAA+PROBE: NOT DETECTED

## 2020-09-17 ENCOUNTER — OUTPATIENT (OUTPATIENT)
Dept: OUTPATIENT SERVICES | Facility: HOSPITAL | Age: 60
LOS: 1 days | End: 2020-09-17

## 2020-09-18 ENCOUNTER — OUTPATIENT (OUTPATIENT)
Dept: OUTPATIENT SERVICES | Facility: HOSPITAL | Age: 60
LOS: 1 days | End: 2020-09-18
Payer: MEDICAID

## 2020-09-18 PROCEDURE — 93458 L HRT ARTERY/VENTRICLE ANGIO: CPT | Mod: 26

## 2020-09-18 PROCEDURE — 93571 IV DOP VEL&/PRESS C FLO 1ST: CPT | Mod: 26,52

## 2020-09-18 PROCEDURE — ZZZZZ: CPT

## 2020-09-18 PROCEDURE — 93010 ELECTROCARDIOGRAM REPORT: CPT

## 2020-09-22 ENCOUNTER — APPOINTMENT (OUTPATIENT)
Dept: CARDIOLOGY | Facility: CLINIC | Age: 60
End: 2020-09-22
Payer: MEDICAID

## 2020-09-22 VITALS
HEART RATE: 101 BPM | SYSTOLIC BLOOD PRESSURE: 130 MMHG | BODY MASS INDEX: 31.78 KG/M2 | TEMPERATURE: 97 F | OXYGEN SATURATION: 94 % | DIASTOLIC BLOOD PRESSURE: 82 MMHG | HEIGHT: 71 IN | WEIGHT: 227 LBS

## 2020-09-22 PROCEDURE — 99213 OFFICE O/P EST LOW 20 MIN: CPT

## 2020-09-22 NOTE — HISTORY OF PRESENT ILLNESS
[FreeTextEntry1] : There is been no recurrence of chest discomfort or shortness of breath.\par \par CAD: Invasive coronary angiography revealed nonobstructive disease.  The RCA was 60%.  IFR was negative.  Continued observation is his best option.\par \par Hyperlipidemia: Continue statin therapy.  \par \par Hypertension: Well-controlled.

## 2020-09-23 ENCOUNTER — APPOINTMENT (OUTPATIENT)
Dept: CARDIOLOGY | Facility: CLINIC | Age: 60
End: 2020-09-23

## 2020-10-09 ENCOUNTER — APPOINTMENT (OUTPATIENT)
Dept: DISASTER EMERGENCY | Facility: CLINIC | Age: 60
End: 2020-10-09

## 2020-10-09 DIAGNOSIS — Z01.818 ENCOUNTER FOR OTHER PREPROCEDURAL EXAMINATION: ICD-10-CM

## 2020-10-29 ENCOUNTER — NON-APPOINTMENT (OUTPATIENT)
Age: 60
End: 2020-10-29

## 2020-11-02 ENCOUNTER — EMERGENCY (EMERGENCY)
Facility: HOSPITAL | Age: 60
LOS: 1 days | End: 2020-11-02
Admitting: EMERGENCY MEDICINE
Payer: MEDICAID

## 2020-11-02 PROCEDURE — 93010 ELECTROCARDIOGRAM REPORT: CPT

## 2020-11-02 PROCEDURE — 74177 CT ABD & PELVIS W/CONTRAST: CPT | Mod: 26

## 2020-11-02 PROCEDURE — 71045 X-RAY EXAM CHEST 1 VIEW: CPT | Mod: 26

## 2020-11-02 PROCEDURE — 99285 EMERGENCY DEPT VISIT HI MDM: CPT

## 2020-11-05 ENCOUNTER — NON-APPOINTMENT (OUTPATIENT)
Age: 60
End: 2020-11-05

## 2020-12-15 ENCOUNTER — APPOINTMENT (OUTPATIENT)
Dept: CARDIOLOGY | Facility: CLINIC | Age: 60
End: 2020-12-15

## 2021-02-19 ENCOUNTER — NON-APPOINTMENT (OUTPATIENT)
Age: 61
End: 2021-02-19

## 2021-03-16 ENCOUNTER — APPOINTMENT (OUTPATIENT)
Dept: CARDIOLOGY | Facility: CLINIC | Age: 61
End: 2021-03-16

## 2021-05-04 ENCOUNTER — APPOINTMENT (OUTPATIENT)
Dept: CARDIOLOGY | Facility: CLINIC | Age: 61
End: 2021-05-04
Payer: MEDICAID

## 2021-05-04 VITALS
HEART RATE: 88 BPM | WEIGHT: 225 LBS | HEIGHT: 71 IN | TEMPERATURE: 97 F | SYSTOLIC BLOOD PRESSURE: 122 MMHG | OXYGEN SATURATION: 95 % | DIASTOLIC BLOOD PRESSURE: 84 MMHG | BODY MASS INDEX: 31.5 KG/M2

## 2021-05-04 PROCEDURE — 99213 OFFICE O/P EST LOW 20 MIN: CPT

## 2021-05-04 PROCEDURE — 99072 ADDL SUPL MATRL&STAF TM PHE: CPT

## 2021-05-04 NOTE — HISTORY OF PRESENT ILLNESS
[FreeTextEntry1] : CAD: Patient status post coronary stenting.  The patient walks with a cane.  There is no exertional symptoms.\par \par Hypertension: Well-controlled.\par \par Hyperlipidemia: The patient is on high-dose statin plus Zetia.  LFTs and cholesterol profile have been arranged.

## 2021-05-04 NOTE — ASSESSMENT
[FreeTextEntry1] : CAD: Patient status post coronary stenting.  The patient walks with a cane.  There is no exertional symptoms.\par \par Hypertension: Well-controlled.\par \par Hyperlipidemia: The patient is on high-dose statin plus Zetia.  LFTs and cholesterol profile have been arranged.\par

## 2021-05-25 ENCOUNTER — NON-APPOINTMENT (OUTPATIENT)
Age: 61
End: 2021-05-25

## 2021-08-20 ENCOUNTER — NON-APPOINTMENT (OUTPATIENT)
Age: 61
End: 2021-08-20

## 2021-08-23 ENCOUNTER — APPOINTMENT (OUTPATIENT)
Dept: CARDIOLOGY | Facility: CLINIC | Age: 61
End: 2021-08-23
Payer: MEDICAID

## 2021-08-23 VITALS
SYSTOLIC BLOOD PRESSURE: 108 MMHG | BODY MASS INDEX: 33.04 KG/M2 | TEMPERATURE: 97 F | WEIGHT: 236 LBS | DIASTOLIC BLOOD PRESSURE: 74 MMHG | HEART RATE: 83 BPM | OXYGEN SATURATION: 96 % | HEIGHT: 71 IN

## 2021-08-23 DIAGNOSIS — M54.9 DORSALGIA, UNSPECIFIED: ICD-10-CM

## 2021-08-23 PROCEDURE — 99215 OFFICE O/P EST HI 40 MIN: CPT

## 2021-08-23 RX ORDER — BUDESONIDE AND FORMOTEROL FUMARATE DIHYDRATE 80; 4.5 UG/1; UG/1
80-4.5 AEROSOL RESPIRATORY (INHALATION)
Refills: 0 | Status: DISCONTINUED | COMMUNITY
End: 2021-08-23

## 2021-08-23 RX ORDER — RANITIDINE 300 MG/1
300 TABLET ORAL
Refills: 0 | Status: DISCONTINUED | COMMUNITY
End: 2021-08-23

## 2021-08-23 RX ORDER — TIOTROPIUM BROMIDE 18 UG/1
18 CAPSULE ORAL; RESPIRATORY (INHALATION)
Refills: 0 | Status: DISCONTINUED | COMMUNITY
End: 2021-08-23

## 2021-08-23 NOTE — ASSESSMENT
[FreeTextEntry1] : CAD: Patient status post coronary stenting.  The patient walks with a cane.  Recent onset of exertional angina.  If he develops any chest pain that does not relieve with rest or within less than 10 minutes he will call 911 and go to nearest emergency room.  I recommend cardiac catheterization to rule out any progression of obstructive CAD causing the symptoms.\par \par Hypertension: Well-controlled.\par \par Hyperlipidemia: The patient is on high-dose statin plus Zetia.  LFTs and cholesterol profile have been arranged.\par

## 2021-08-23 NOTE — HISTORY OF PRESENT ILLNESS
[FreeTextEntry1] : CAD: Patient status post coronary stenting.  The patient walks with a cane.  He is doing cardiac rehabilitation.  Within the last few weeks he has been complaining of exertional chest pain.  It usually relieves with rest within less than a minute.\par Hypertension: Well-controlled.\par \par Hyperlipidemia: The patient is on high-dose statin plus Zetia.  LFTs and cholesterol profile have been arranged.

## 2021-08-26 ENCOUNTER — OUTPATIENT (OUTPATIENT)
Dept: OUTPATIENT SERVICES | Facility: HOSPITAL | Age: 61
LOS: 1 days | End: 2021-08-26

## 2021-08-30 ENCOUNTER — APPOINTMENT (OUTPATIENT)
Dept: DISASTER EMERGENCY | Facility: CLINIC | Age: 61
End: 2021-08-30

## 2021-09-01 ENCOUNTER — TRANSCRIPTION ENCOUNTER (OUTPATIENT)
Age: 61
End: 2021-09-01

## 2021-09-02 ENCOUNTER — OUTPATIENT (OUTPATIENT)
Dept: OUTPATIENT SERVICES | Facility: HOSPITAL | Age: 61
LOS: 1 days | End: 2021-09-02
Payer: MEDICAID

## 2021-09-02 PROCEDURE — 93308 TTE F-UP OR LMTD: CPT | Mod: 26

## 2021-09-02 PROCEDURE — 93010 ELECTROCARDIOGRAM REPORT: CPT | Mod: 76

## 2021-09-02 PROCEDURE — 92928 PRQ TCAT PLMT NTRAC ST 1 LES: CPT | Mod: RC

## 2021-09-02 PROCEDURE — 92978 ENDOLUMINL IVUS OCT C 1ST: CPT | Mod: 26,RC

## 2021-09-02 PROCEDURE — 93458 L HRT ARTERY/VENTRICLE ANGIO: CPT | Mod: 26,59

## 2021-09-03 PROCEDURE — 93010 ELECTROCARDIOGRAM REPORT: CPT

## 2021-09-08 ENCOUNTER — APPOINTMENT (OUTPATIENT)
Dept: CARDIOLOGY | Facility: CLINIC | Age: 61
End: 2021-09-08
Payer: MEDICAID

## 2021-09-08 ENCOUNTER — TRANSCRIPTION ENCOUNTER (OUTPATIENT)
Age: 61
End: 2021-09-08

## 2021-09-08 ENCOUNTER — NON-APPOINTMENT (OUTPATIENT)
Age: 61
End: 2021-09-08

## 2021-09-08 VITALS
HEIGHT: 71 IN | HEART RATE: 81 BPM | BODY MASS INDEX: 32.9 KG/M2 | DIASTOLIC BLOOD PRESSURE: 70 MMHG | TEMPERATURE: 97.3 F | SYSTOLIC BLOOD PRESSURE: 100 MMHG | WEIGHT: 235 LBS | OXYGEN SATURATION: 97 %

## 2021-09-08 PROCEDURE — 99215 OFFICE O/P EST HI 40 MIN: CPT

## 2021-09-08 PROCEDURE — 93000 ELECTROCARDIOGRAM COMPLETE: CPT

## 2021-09-08 RX ORDER — CLOPIDOGREL BISULFATE 75 MG/1
75 TABLET, FILM COATED ORAL
Qty: 90 | Refills: 3 | Status: DISCONTINUED | COMMUNITY
Start: 2017-12-06 | End: 2021-09-08

## 2021-09-08 RX ORDER — MONTELUKAST 10 MG/1
10 TABLET, FILM COATED ORAL
Refills: 0 | Status: DISCONTINUED | COMMUNITY
End: 2021-09-08

## 2021-09-08 RX ORDER — OXYCODONE AND ACETAMINOPHEN 10; 325 MG/1; MG/1
10-325 TABLET ORAL EVERY 6 HOURS
Refills: 0 | Status: DISCONTINUED | COMMUNITY
End: 2021-09-08

## 2021-09-08 RX ORDER — OXYCODONE 10 MG/1
10 TABLET ORAL
Refills: 0 | Status: ACTIVE | COMMUNITY

## 2021-09-08 RX ORDER — ROSUVASTATIN CALCIUM 40 MG/1
40 TABLET, FILM COATED ORAL
Qty: 90 | Refills: 0 | Status: DISCONTINUED | COMMUNITY
Start: 2018-10-22 | End: 2021-09-08

## 2021-09-08 RX ORDER — LEVOTHYROXINE SODIUM 0.1 MG/1
100 TABLET ORAL DAILY
Refills: 0 | Status: ACTIVE | COMMUNITY

## 2021-09-08 NOTE — DISCUSSION/SUMMARY
[FreeTextEntry1] : \par 61-year-old male with history of hypertension, hyperlipidemia, cystic kidney disease, known PAD with thoracic aortic aneurysm 4.5 cm on CT April 2021 stable, extensive CAD with MI in 2014 proximal LAD followed by drug-eluting stents to proximal and mid RCA in 2014 and diagonal in 2017 now after coronary angiogram with PCI of ISR in RCA territory.  Found to have patent LAD and diagonal stents.\par \par Since discharge, patient has been doing well.  No recurrence of chest pain or dyspnea.  Compliant with medication changes including Plavix to Brilinta.  Awaiting cardiac rehab.\par Lab work in the past few months has been reviewed with well-controlled lipid profile.  Creatinine 1.3.  Normal CBC.\par \par \par PLAN:\par -Increase rosuvastatin to 40.  Continue dual antiplatelet therapy for at least 1 year, longer pending clinical course given extensive stenting.\par -Ordered cardiac rehab; PATIENT IS CLEARED FOR CARDIAC REHAB.\par -Follow surveillance testing and lab work cardiologist in 3 months Dr. Avila\par -Diet/lifestyle optimization.  Weight loss.  Mediterranean diet.\par \par \par Return with Dr. Avila in 3 months.  Cardiac rehab in interim.  ER precautions given to patient.\par 
denies pain/discomfort

## 2021-09-08 NOTE — PHYSICAL EXAM
[Normal] : normal S1, S2, no murmur, no rub, no gallop [Well Developed] : well developed [Well Nourished] : well nourished [No Acute Distress] : no acute distress [Normal Conjunctiva] : normal conjunctiva [Normal Venous Pressure] : normal venous pressure [No Carotid Bruit] : no carotid bruit [Normal S1, S2] : normal S1, S2 [No Rub] : no rub [No Gallop] : no gallop [Clear Lung Fields] : clear lung fields [Good Air Entry] : good air entry [No Respiratory Distress] : no respiratory distress  [Soft] : abdomen soft [Non Tender] : non-tender [Normal Bowel Sounds] : normal bowel sounds [Normal Gait] : normal gait [No Edema] : no edema [No Cyanosis] : no cyanosis [No Clubbing] : no clubbing [No Varicosities] : no varicosities [No Rash] : no rash [No Skin Lesions] : no skin lesions [Moves all extremities] : moves all extremities [No Focal Deficits] : no focal deficits [Normal Speech] : normal speech [Alert and Oriented] : alert and oriented [Normal memory] : normal memory

## 2021-09-08 NOTE — HISTORY OF PRESENT ILLNESS
[FreeTextEntry1] : \par 61-year-old male with history of hypertension, hyperlipidemia, cystic kidney disease, known PAD with thoracic aortic aneurysm 4.5 cm on CT April 2021 stable, extensive CAD with MI in 2014 proximal LAD followed by drug-eluting stents to proximal and mid RCA in 2014 and diagonal in 2017 now after coronary angiogram with PCI of ISR in RCA territory.  Found to have patent LAD and diagonal stents.\par \par Since discharge, patient has been doing well.  No recurrence of chest pain or dyspnea.  Compliant with medication changes including Plavix to Brilinta.  Awaiting cardiac rehab.\par Lab work in the past few months has been reviewed with well-controlled lipid profile.  Creatinine 1.3.  Normal CBC.\par \par Right radial artery access site clean dry and intact.

## 2021-09-21 ENCOUNTER — NON-APPOINTMENT (OUTPATIENT)
Age: 61
End: 2021-09-21

## 2021-09-23 ENCOUNTER — NON-APPOINTMENT (OUTPATIENT)
Age: 61
End: 2021-09-23

## 2021-09-30 ENCOUNTER — APPOINTMENT (OUTPATIENT)
Dept: CARDIOLOGY | Facility: CLINIC | Age: 61
End: 2021-09-30
Payer: MEDICAID

## 2021-09-30 VITALS
HEART RATE: 82 BPM | WEIGHT: 232 LBS | SYSTOLIC BLOOD PRESSURE: 110 MMHG | OXYGEN SATURATION: 97 % | HEIGHT: 71 IN | BODY MASS INDEX: 32.48 KG/M2 | DIASTOLIC BLOOD PRESSURE: 66 MMHG | TEMPERATURE: 97 F

## 2021-09-30 PROCEDURE — 99214 OFFICE O/P EST MOD 30 MIN: CPT

## 2021-09-30 NOTE — ASSESSMENT
[FreeTextEntry1] : CAD: Unfortunate the patient has developed severe fatigue as well as a chest discomfort syndrome.  The patient had multiple episodes of chest discomfort lasting a few seconds at a time.  There is no exertional component to it.  The patient cannot ambulate well because he uses a cane.  There is no shortness of breath.  Pharmacologic nuclear stress testing is the best option to rule out ischemia.  The patient did not get much significant clinical benefit from recent coronary stenting.  The patient has been advised to call 911 for any chest discomfort lasting over 15 minutes.\par \par Hypertension: Fairly well controlled.

## 2021-10-18 ENCOUNTER — APPOINTMENT (OUTPATIENT)
Dept: CARDIOLOGY | Facility: CLINIC | Age: 61
End: 2021-10-18
Payer: MEDICAID

## 2021-10-18 PROCEDURE — 78452 HT MUSCLE IMAGE SPECT MULT: CPT

## 2021-10-18 PROCEDURE — 93015 CV STRESS TEST SUPVJ I&R: CPT

## 2021-10-18 PROCEDURE — A9502: CPT

## 2021-10-21 ENCOUNTER — NON-APPOINTMENT (OUTPATIENT)
Age: 61
End: 2021-10-21

## 2021-10-21 ENCOUNTER — APPOINTMENT (OUTPATIENT)
Dept: CARDIOLOGY | Facility: CLINIC | Age: 61
End: 2021-10-21
Payer: MEDICAID

## 2021-10-21 VITALS
WEIGHT: 222 LBS | HEART RATE: 106 BPM | OXYGEN SATURATION: 98 % | HEIGHT: 71 IN | BODY MASS INDEX: 31.08 KG/M2 | DIASTOLIC BLOOD PRESSURE: 64 MMHG | SYSTOLIC BLOOD PRESSURE: 112 MMHG | TEMPERATURE: 97 F

## 2021-10-21 PROCEDURE — 99214 OFFICE O/P EST MOD 30 MIN: CPT

## 2021-10-21 PROCEDURE — 93000 ELECTROCARDIOGRAM COMPLETE: CPT

## 2021-10-21 RX ORDER — METOPROLOL SUCCINATE 25 MG/1
25 TABLET, EXTENDED RELEASE ORAL
Qty: 90 | Refills: 1 | Status: DISCONTINUED | COMMUNITY
Start: 2018-04-17 | End: 2021-10-21

## 2021-10-21 RX ORDER — FLUTICASONE PROPIONATE AND SALMETEROL 250; 50 UG/1; UG/1
250-50 POWDER RESPIRATORY (INHALATION)
Qty: 60 | Refills: 0 | Status: DISCONTINUED | COMMUNITY
Start: 2020-11-09 | End: 2021-10-21

## 2021-10-21 NOTE — ASSESSMENT
[FreeTextEntry1] : CAD: The patient did not get much clinical benefit from his most recent coronary stent.  Noninvasive testing is negative.  Overall have elected to continue observation at this time.\par \par Hypertension: Well-controlled.\par \par Tachycardia: We will check ECG today.  24-hour Holter monitoring has been arranged.\par \par Fatigue: Markedly better after coming off beta-blockade.  Overall elected to withhold beta-blockade given the significant improvement.

## 2021-10-22 PROCEDURE — 93224 XTRNL ECG REC UP TO 48 HRS: CPT

## 2021-11-10 ENCOUNTER — RX RENEWAL (OUTPATIENT)
Age: 61
End: 2021-11-10

## 2021-11-18 ENCOUNTER — APPOINTMENT (OUTPATIENT)
Dept: CARDIOLOGY | Facility: CLINIC | Age: 61
End: 2021-11-18

## 2021-11-30 ENCOUNTER — RX RENEWAL (OUTPATIENT)
Age: 61
End: 2021-11-30

## 2021-12-02 ENCOUNTER — APPOINTMENT (OUTPATIENT)
Dept: CARDIOLOGY | Facility: CLINIC | Age: 61
End: 2021-12-02

## 2021-12-08 ENCOUNTER — NON-APPOINTMENT (OUTPATIENT)
Age: 61
End: 2021-12-08

## 2021-12-13 ENCOUNTER — NON-APPOINTMENT (OUTPATIENT)
Age: 61
End: 2021-12-13

## 2021-12-15 ENCOUNTER — APPOINTMENT (OUTPATIENT)
Dept: CARDIOLOGY | Facility: CLINIC | Age: 61
End: 2021-12-15

## 2022-01-18 ENCOUNTER — NON-APPOINTMENT (OUTPATIENT)
Age: 62
End: 2022-01-18

## 2022-02-08 ENCOUNTER — APPOINTMENT (OUTPATIENT)
Dept: CARDIOLOGY | Facility: CLINIC | Age: 62
End: 2022-02-08
Payer: MEDICARE

## 2022-02-08 VITALS
HEART RATE: 88 BPM | OXYGEN SATURATION: 97 % | HEIGHT: 71 IN | TEMPERATURE: 97.3 F | BODY MASS INDEX: 31.5 KG/M2 | WEIGHT: 225 LBS

## 2022-02-08 VITALS — DIASTOLIC BLOOD PRESSURE: 82 MMHG | SYSTOLIC BLOOD PRESSURE: 126 MMHG

## 2022-02-08 PROCEDURE — 99214 OFFICE O/P EST MOD 30 MIN: CPT

## 2022-02-08 NOTE — HISTORY OF PRESENT ILLNESS
[FreeTextEntry1] : CAD: Patient status post coronary stenting.  After last coronary stent he did not get much clinical benefit.  Patient has fairly good exercise capacity without exertional symptoms.\par \par Unequal blood pressures: Right subclavian severe angulation and stenosis.  The right arm does not claudicate.  Overall elected to continue observation.\par \par Hypertension: Fairly well controlled.\par \par Hyperlipidemia: LFTs and cholesterol profile have been arranged.\par \par Shortness of breath: Patient got significant clinical benefit from cardiac rehab in the past.  Will reattempt rehab therapy.

## 2022-02-24 ENCOUNTER — OUTPATIENT (OUTPATIENT)
Dept: OUTPATIENT SERVICES | Facility: HOSPITAL | Age: 62
LOS: 1 days | End: 2022-02-24

## 2022-03-08 ENCOUNTER — NON-APPOINTMENT (OUTPATIENT)
Age: 62
End: 2022-03-08

## 2022-03-09 DIAGNOSIS — R06.00 DYSPNEA, UNSPECIFIED: ICD-10-CM

## 2022-03-30 ENCOUNTER — NON-APPOINTMENT (OUTPATIENT)
Age: 62
End: 2022-03-30

## 2022-03-31 ENCOUNTER — APPOINTMENT (OUTPATIENT)
Dept: CARDIOLOGY | Facility: CLINIC | Age: 62
End: 2022-03-31
Payer: MEDICARE

## 2022-03-31 VITALS
HEART RATE: 82 BPM | OXYGEN SATURATION: 94 % | DIASTOLIC BLOOD PRESSURE: 68 MMHG | BODY MASS INDEX: 32.2 KG/M2 | SYSTOLIC BLOOD PRESSURE: 102 MMHG | HEIGHT: 71 IN | WEIGHT: 230 LBS | TEMPERATURE: 97.3 F

## 2022-03-31 PROCEDURE — 99214 OFFICE O/P EST MOD 30 MIN: CPT

## 2022-03-31 RX ORDER — TICAGRELOR 90 MG/1
90 TABLET ORAL TWICE DAILY
Qty: 180 | Refills: 0 | Status: DISCONTINUED | COMMUNITY
Start: 2021-09-08 | End: 2022-03-31

## 2022-03-31 RX ORDER — CYCLOBENZAPRINE HYDROCHLORIDE 10 MG/1
10 TABLET, FILM COATED ORAL DAILY
Refills: 0 | Status: DISCONTINUED | COMMUNITY
End: 2022-03-31

## 2022-03-31 RX ORDER — UMECLIDINIUM 62.5 UG/1
62.5 AEROSOL, POWDER ORAL
Qty: 90 | Refills: 0 | Status: DISCONTINUED | COMMUNITY
Start: 2021-07-26 | End: 2022-03-31

## 2022-03-31 RX ORDER — ALFUZOSIN HCL 10 MG/1
10 TABLET, EXTENDED RELEASE ORAL DAILY
Refills: 0 | Status: DISCONTINUED | COMMUNITY
End: 2022-03-31

## 2022-03-31 RX ORDER — FLUTICASONE PROPIONATE AND SALMETEROL 232; 14 UG/1; UG/1
232-14 POWDER, METERED RESPIRATORY (INHALATION)
Qty: 1 | Refills: 0 | Status: DISCONTINUED | COMMUNITY
Start: 2021-08-18 | End: 2022-03-31

## 2022-03-31 NOTE — ASSESSMENT
[FreeTextEntry1] : CAD: Patient status post coronary stenting.  The patient did not get any clinical benefit of coronary stenting.  The patient has dyspnea on exertion after moderate exertion.  Is been unchanged for quite some time.\par \par Hypertension: Well-controlled.\par \par Fatigue: Patient is complaining of fatigue and lassitude.  I have advised him to cut his narcotic use in half and then discontinue if possible.  In addition trazodone will be reduced and eliminated if possible.  If these measures fail we will consider repeat invasive coronary angiography.  There is some residual disease which has not undergone coronary stenting.

## 2022-04-14 ENCOUNTER — NON-APPOINTMENT (OUTPATIENT)
Age: 62
End: 2022-04-14

## 2022-04-20 ENCOUNTER — NON-APPOINTMENT (OUTPATIENT)
Age: 62
End: 2022-04-20

## 2022-04-25 ENCOUNTER — APPOINTMENT (OUTPATIENT)
Dept: UROLOGY | Facility: CLINIC | Age: 62
End: 2022-04-25
Payer: MEDICARE

## 2022-04-25 ENCOUNTER — NON-APPOINTMENT (OUTPATIENT)
Age: 62
End: 2022-04-25

## 2022-04-25 VITALS
TEMPERATURE: 97.3 F | WEIGHT: 234.8 LBS | SYSTOLIC BLOOD PRESSURE: 109 MMHG | HEART RATE: 101 BPM | BODY MASS INDEX: 32.87 KG/M2 | HEIGHT: 71 IN | DIASTOLIC BLOOD PRESSURE: 67 MMHG

## 2022-04-25 DIAGNOSIS — N43.3 HYDROCELE, UNSPECIFIED: ICD-10-CM

## 2022-04-25 PROCEDURE — 99203 OFFICE O/P NEW LOW 30 MIN: CPT

## 2022-04-25 NOTE — PHYSICAL EXAM
[General Appearance - Well Developed] : well developed [Normal Appearance] : normal appearance [Well Groomed] : well groomed [General Appearance - In No Acute Distress] : no acute distress [Edema] : no peripheral edema [Respiration, Rhythm And Depth] : normal respiratory rhythm and effort [Exaggerated Use Of Accessory Muscles For Inspiration] : no accessory muscle use [Abdomen Tenderness] : non-tender [Abdomen Soft] : soft [Costovertebral Angle Tenderness] : no ~M costovertebral angle tenderness [Urethral Meatus] : meatus normal [Urinary Bladder Findings] : the bladder was normal on palpation [Testes Mass (___cm)] : there were no testicular masses [No Prostate Nodules] : no prostate nodules [FreeTextEntry1] : Very mild left hydrocele [Normal Station and Gait] : the gait and station were normal for the patient's age [No Focal Deficits] : no focal deficits [] : no rash [Oriented To Time, Place, And Person] : oriented to person, place, and time [Affect] : the affect was normal [Mood] : the mood was normal [Not Anxious] : not anxious [No Palpable Adenopathy] : no palpable adenopathy

## 2022-04-25 NOTE — ASSESSMENT
[FreeTextEntry1] : I reviewed patients labs and diagnostics\par I also did physial exam\par I explained to the patient that he has very mild left hydrocele and does not need any intervention\par Patient understood

## 2022-04-25 NOTE — HISTORY OF PRESENT ILLNESS
[FreeTextEntry1] : 62 year-old patient presented today for the second opinion> He has left hydrocele and wanted to make yaw eif he needs any intervention\par The hydrocele does not bother him except of cosmetic shape\par Patient is know to have CKD\par

## 2022-04-28 ENCOUNTER — APPOINTMENT (OUTPATIENT)
Dept: CARDIOLOGY | Facility: CLINIC | Age: 62
End: 2022-04-28
Payer: MEDICARE

## 2022-04-28 VITALS
SYSTOLIC BLOOD PRESSURE: 118 MMHG | OXYGEN SATURATION: 95 % | TEMPERATURE: 97.5 F | WEIGHT: 234 LBS | HEART RATE: 92 BPM | BODY MASS INDEX: 32.76 KG/M2 | DIASTOLIC BLOOD PRESSURE: 70 MMHG | HEIGHT: 71 IN

## 2022-04-28 PROCEDURE — 99214 OFFICE O/P EST MOD 30 MIN: CPT

## 2022-04-28 NOTE — HISTORY OF PRESENT ILLNESS
[FreeTextEntry1] : CAD: Patient status post coronary stenting.  Patient did not initially get good clinical benefit.  Now he is exercising at rehab for 30 to 60 minutes.  He is doing very well.\par \par Hyperlipidemia: The patient is on statin and Zetia.  LDL has been achieved.\par \par Hypertension: Patient has olmesartan prescribed by his primary care doctor.  When he runs out of his losartan he will start the olmesartan. [FreeTextEntry8] : RICHIE BECKER is a 30 year old male patient with a PMHx of leukopenia who presents today complaining of dizziness with loss of balance for two weeks and headache for 3-4 weeks. Patient states that the headaches extend from the back of his head on his right side to his right eye. He denies fever, cough, or congestion.

## 2022-04-28 NOTE — ASSESSMENT
[FreeTextEntry1] : CAD: Patient status post coronary stenting.  Patient did not initially get good clinical benefit.  Now he is exercising at rehab for 30 to 60 minutes.  He is doing very well.\par \par Hyperlipidemia: The patient is on statin and Zetia.  LDL has been achieved.\par \par Hypertension: Patient has olmesartan prescribed by his primary care doctor.  When he runs out of his losartan he will start the olmesartan.\par \par I have independently reviewed his stress test nuclear images.  There appears to be normal myocardial perfusion.

## 2022-06-30 ENCOUNTER — APPOINTMENT (OUTPATIENT)
Dept: ORTHOPEDIC SURGERY | Facility: CLINIC | Age: 62
End: 2022-06-30

## 2022-06-30 PROCEDURE — 99203 OFFICE O/P NEW LOW 30 MIN: CPT

## 2022-06-30 PROCEDURE — 73610 X-RAY EXAM OF ANKLE: CPT | Mod: RT

## 2022-06-30 NOTE — DISCUSSION/SUMMARY
[de-identified] : Patient presents with traumatic arthritis of the right ankle and get a script for CT scan. \par Will follow up after CT images are received. \par

## 2022-06-30 NOTE — PHYSICAL EXAM
[Right] : right foot and ankle [5___[facility] [Normal] : saphenous nerve sensation normal [] : mildly antalgic [Weight -] : weightbearing [FreeTextEntry3] : General ache  [FreeTextEntry9] : Traumatic Arthritis  [de-identified] : plantar flexion 20 degrees [de-identified] : inversion 0 degrees [de-identified] : eversion 0 degrees [TWNoteComboBox7] : dorsiflexion 0 degrees

## 2022-06-30 NOTE — HISTORY OF PRESENT ILLNESS
[Gradual] : gradual [Dull/Aching] : dull/aching [Throbbing] : throbbing [Constant] : constant [de-identified] : C/o pain in the RT ankle. Patient reports chronic pain for about 6 months. He explains that he did fracture the ankle 20+ years ago. He complains of constant pain, patient describes the pain as dull aching.  [] : no

## 2022-07-01 ENCOUNTER — NON-APPOINTMENT (OUTPATIENT)
Age: 62
End: 2022-07-01

## 2022-07-05 ENCOUNTER — NON-APPOINTMENT (OUTPATIENT)
Age: 62
End: 2022-07-05

## 2022-07-06 ENCOUNTER — APPOINTMENT (OUTPATIENT)
Dept: CARDIOLOGY | Facility: CLINIC | Age: 62
End: 2022-07-06

## 2022-07-06 ENCOUNTER — NON-APPOINTMENT (OUTPATIENT)
Age: 62
End: 2022-07-06

## 2022-07-06 VITALS
BODY MASS INDEX: 30.94 KG/M2 | DIASTOLIC BLOOD PRESSURE: 70 MMHG | HEART RATE: 90 BPM | WEIGHT: 221 LBS | OXYGEN SATURATION: 97 % | SYSTOLIC BLOOD PRESSURE: 110 MMHG | HEIGHT: 71 IN

## 2022-07-06 PROCEDURE — 93000 ELECTROCARDIOGRAM COMPLETE: CPT

## 2022-07-06 PROCEDURE — 99214 OFFICE O/P EST MOD 30 MIN: CPT | Mod: 25

## 2022-07-06 RX ORDER — BUPROPION HYDROCHLORIDE 300 MG/1
300 TABLET, EXTENDED RELEASE ORAL DAILY
Refills: 0 | Status: ACTIVE | COMMUNITY

## 2022-07-06 RX ORDER — LOSARTAN POTASSIUM 100 MG/1
100 TABLET, FILM COATED ORAL DAILY
Qty: 90 | Refills: 3 | Status: DISCONTINUED | COMMUNITY
Start: 1900-01-01 | End: 2022-07-06

## 2022-07-06 NOTE — HISTORY OF PRESENT ILLNESS
[FreeTextEntry1] : CAD: Patient status post coronary stenting.  The patient did not get any clinical benefit of coronary stenting.  The patient has dyspnea on exertion after moderate exertion.  Is been unchanged for quite some time.\par \par Hypertension: Well-controlled. Patient had his losartan switched to olmesartan 40mg daily. Had dizziness on the 40mg dose. He cut pill in half. Dizziness resolved and BP has has remained well controlled.

## 2022-07-07 ENCOUNTER — FORM ENCOUNTER (OUTPATIENT)
Age: 62
End: 2022-07-07

## 2022-07-12 ENCOUNTER — APPOINTMENT (OUTPATIENT)
Dept: ORTHOPEDIC SURGERY | Facility: CLINIC | Age: 62
End: 2022-07-12

## 2022-07-12 PROCEDURE — 99213 OFFICE O/P EST LOW 20 MIN: CPT

## 2022-07-12 NOTE — DISCUSSION/SUMMARY
[de-identified] : CT reviewed findings, anatomy and pathology  discussed and pt understands. questions answered, pt left pleased\par After discussion of Dx & Treatment options was discuss , pt elected to Tx non operatively with exercise , medications, physical therapy and activity modification.\par \par

## 2022-07-12 NOTE — PHYSICAL EXAM
[5___] : [facility] 5[unfilled]/5 [Normal] : saphenous nerve sensation normal [Right] : right ankle [Degenerative change] : Degenerative change [Weight -] : weightbearing [] : no gross deformity [FreeTextEntry3] : General ache  [FreeTextEntry9] : ct Traumatic Arthritis  [de-identified] : plantar flexion 20 degrees [de-identified] : inversion 0 degrees [de-identified] : eversion 0 degrees [TWNoteComboBox7] : dorsiflexion 0 degrees

## 2022-08-03 ENCOUNTER — APPOINTMENT (OUTPATIENT)
Dept: CARDIOLOGY | Facility: CLINIC | Age: 62
End: 2022-08-03

## 2022-08-03 PROCEDURE — 99212 OFFICE O/P EST SF 10 MIN: CPT | Mod: 95

## 2022-08-03 RX ORDER — TRAZODONE HYDROCHLORIDE 50 MG/1
50 TABLET ORAL
Refills: 0 | Status: DISCONTINUED | COMMUNITY
End: 2022-08-03

## 2022-08-03 NOTE — HISTORY OF PRESENT ILLNESS
[FreeTextEntry1] : CAD:  excelent exercise capacity without exertional sx.\par \par Chol: cont statin

## 2022-08-03 NOTE — ASSESSMENT
[FreeTextEntry1] : TEB\par \par CAD:  excelent exercise capacity without exertional sx.\par \par Chol: cont statin

## 2022-08-11 ENCOUNTER — NON-APPOINTMENT (OUTPATIENT)
Age: 62
End: 2022-08-11

## 2022-08-24 ENCOUNTER — NON-APPOINTMENT (OUTPATIENT)
Age: 62
End: 2022-08-24

## 2022-08-30 ENCOUNTER — NON-APPOINTMENT (OUTPATIENT)
Age: 62
End: 2022-08-30

## 2022-08-31 ENCOUNTER — APPOINTMENT (OUTPATIENT)
Dept: CARDIOLOGY | Facility: CLINIC | Age: 62
End: 2022-08-31

## 2022-08-31 VITALS
BODY MASS INDEX: 30.1 KG/M2 | HEIGHT: 71 IN | WEIGHT: 215 LBS | OXYGEN SATURATION: 94 % | TEMPERATURE: 97.3 F | DIASTOLIC BLOOD PRESSURE: 62 MMHG | SYSTOLIC BLOOD PRESSURE: 98 MMHG | HEART RATE: 102 BPM

## 2022-08-31 PROCEDURE — 99214 OFFICE O/P EST MOD 30 MIN: CPT

## 2022-08-31 NOTE — HISTORY OF PRESENT ILLNESS
[FreeTextEntry1] : CAD: Unfortunate the patient developed left arm discomfort.  It lasted approximately 5 minutes.  It was not related to exertion.  He only had 1 episode.  In the past the patient has had left arm discomfort after sleeping on his left side.  The risks and benefits of invasive coronary angiography have been reviewed.  Overall we elected to continue observation at this time.  The patient did not get any clinical benefit from coronary stenting in the past.\par \par Hypertension: The patient started olmesartan.  He developed head rushes.  Overall he does not wish to continue with olmesartan because of head rushes.  The patient previously was on losartan.  Losartan 50 mg daily has been started.  Basic metabolic panel has been arranged for 1 week's time.  The patient reports good blood pressure control and no side effects with losartan in the past.  He was changed to olmesartan by another doctor.

## 2022-09-22 ENCOUNTER — NON-APPOINTMENT (OUTPATIENT)
Age: 62
End: 2022-09-22

## 2022-09-22 RX ORDER — LOSARTAN POTASSIUM 50 MG/1
50 TABLET, FILM COATED ORAL DAILY
Qty: 90 | Refills: 3 | Status: DISCONTINUED | COMMUNITY
Start: 2022-08-31 | End: 2022-09-22

## 2022-09-27 ENCOUNTER — APPOINTMENT (OUTPATIENT)
Dept: CARDIOLOGY | Facility: CLINIC | Age: 62
End: 2022-09-27

## 2022-09-29 ENCOUNTER — APPOINTMENT (OUTPATIENT)
Dept: CARDIOLOGY | Facility: CLINIC | Age: 62
End: 2022-09-29

## 2022-09-29 PROCEDURE — 99212 OFFICE O/P EST SF 10 MIN: CPT | Mod: 95

## 2022-09-29 NOTE — HISTORY OF PRESENT ILLNESS
[FreeTextEntry1] : TEB\par \par CAD: Patient status post coronary stenting.  Patient has fairly good exercise capacity.  He recently embarked on cardiopulmonary rehab and he has improved his VO2 max.  In the past the patient did not seem to get any clinical benefit from coronary stenting.\par \par Hypertension: The patient started on olmesartan.  He subsequently developed head rushes.  Olmesartan was discontinued.  Losartan was started.  The patient is back on olmesartan and overall is happy with his quality of life and blood pressure control.  We will continue at this time.

## 2022-10-17 ENCOUNTER — NON-APPOINTMENT (OUTPATIENT)
Age: 62
End: 2022-10-17

## 2022-10-18 ENCOUNTER — NON-APPOINTMENT (OUTPATIENT)
Age: 62
End: 2022-10-18

## 2022-10-18 ENCOUNTER — APPOINTMENT (OUTPATIENT)
Dept: CARDIOLOGY | Facility: CLINIC | Age: 62
End: 2022-10-18

## 2022-10-18 VITALS
DIASTOLIC BLOOD PRESSURE: 70 MMHG | OXYGEN SATURATION: 98 % | TEMPERATURE: 98.2 F | HEIGHT: 71 IN | SYSTOLIC BLOOD PRESSURE: 102 MMHG | WEIGHT: 210 LBS | HEART RATE: 101 BPM | BODY MASS INDEX: 29.4 KG/M2

## 2022-10-18 PROCEDURE — 99214 OFFICE O/P EST MOD 30 MIN: CPT

## 2022-10-18 NOTE — CARDIOLOGY SUMMARY
[de-identified] : 07/6/2022, NSR, poor R wave progression, left axis deviation. [de-identified] : Nuclear stress test 10/18/2021 no ischemia or infarct [de-identified] : 9/2/2021 Ef 60%, borderline LVH [de-identified] : Cath with AP 9/2/2021 - PCI

## 2022-10-18 NOTE — HISTORY OF PRESENT ILLNESS
[FreeTextEntry1] : HECTOR BONILLA  is a 62 year M  who presents today Oct 18, 2022. He called the office yesterday complaining of chest pain/pressure and shortness of breath. Patient recommended to go to the ER for evaluation and refused. \par Over the past week he has been having intermittent chest pain/pressure lasting 5-10min in duration. Approximately a month ago he had a episode of chest pain with left arm pain which required him taking multiple Ntg. \par Symptoms are concerning the patient. \par He is chest pain free in the office today. Last cardiac cath in September 2021. \par Completed cardiac rehab and tolerated well. \par History of HTN, HLD, CAD, \par

## 2022-10-18 NOTE — ASSESSMENT
[FreeTextEntry1] : HECTOR BONILLA  is a 62 year M  who presents today Oct 18, 2022 with the above history and the following active issues. \par \par CAD now with ongoing intermittent chest pain and pressure over the past week. Symptoms are concerning given his past history. Recommend left heart cath at [facility]. He will continue DAPT. Use Ntg as needed. If recurrent symptoms ER evaluation. Continue statin.\par EKG today with NSR and no significant new ST-T wave changes.\par \par Hypertension, blood pressure controlled on my assessment 112/78. Continue increased dose of Olmesartan. Low sodium diet. \par \par HLD continue statin. Lifestyle and risk factor modification. \par \par Red flag symptoms which would warrant sooner emergent evaluation reviewed with the patient. \par Questions and concerns were addressed and answered. \par Limitations of non-invasive testing reviewed.\par \par Sincerely,\par \par Valentina Caldera PA-C\par Patients history, testing and plan reviewed with supervising MD: Dr. Mireles

## 2022-10-19 ENCOUNTER — NON-APPOINTMENT (OUTPATIENT)
Age: 62
End: 2022-10-19

## 2022-10-24 ENCOUNTER — NON-APPOINTMENT (OUTPATIENT)
Age: 62
End: 2022-10-24

## 2022-10-24 ENCOUNTER — APPOINTMENT (OUTPATIENT)
Dept: CARDIOLOGY | Facility: CLINIC | Age: 62
End: 2022-10-24

## 2022-10-24 VITALS
WEIGHT: 216 LBS | SYSTOLIC BLOOD PRESSURE: 140 MMHG | BODY MASS INDEX: 30.24 KG/M2 | DIASTOLIC BLOOD PRESSURE: 64 MMHG | HEART RATE: 104 BPM | HEIGHT: 71 IN | OXYGEN SATURATION: 96 % | TEMPERATURE: 97.3 F

## 2022-10-24 PROCEDURE — 99214 OFFICE O/P EST MOD 30 MIN: CPT

## 2022-10-24 RX ORDER — CLOPIDOGREL BISULFATE 75 MG/1
75 TABLET, FILM COATED ORAL DAILY
Qty: 90 | Refills: 3 | Status: DISCONTINUED | COMMUNITY
Start: 2022-03-31 | End: 2022-10-24

## 2022-10-24 NOTE — HISTORY OF PRESENT ILLNESS
[FreeTextEntry1] : \par 63 yo M\par CAD with recent unstable angina due to mid RCA ISR mechanism underexpansion with minimal luminal gain after high-pressure noncompliant ballooning and scoring balloon necessitating laser atherectomy.  Expansion is limited by fibrotic tissue therefore lithotripsy will not have significant benefit.\par \par Compliant with Brilinta. no further chest pain.\par \par R CFA access site well-healing. \par Cr 1.4.

## 2022-10-24 NOTE — DISCUSSION/SUMMARY
[FreeTextEntry1] : \par CAD with recent unstable angina due to mid RCA ISR mechanism underexpansion with minimal luminal gain after high-pressure noncompliant ballooning and scoring balloon necessitating laser atherectomy.  Expansion is limited by fibrotic tissue therefore lithotripsy will not have significant benefit.\par \par Compliant with Brilinta. no further chest pain. \par \par R CFA access site well-healing. \par Cr 1.4.

## 2022-10-25 ENCOUNTER — NON-APPOINTMENT (OUTPATIENT)
Age: 62
End: 2022-10-25

## 2022-10-26 LAB
ANION GAP SERPL CALC-SCNC: 13 MMOL/L
BASOPHILS # BLD AUTO: 0.06 K/UL
BASOPHILS NFR BLD AUTO: 0.8 %
BUN SERPL-MCNC: 10 MG/DL
CALCIUM SERPL-MCNC: 9.3 MG/DL
CHLORIDE SERPL-SCNC: 103 MMOL/L
CO2 SERPL-SCNC: 22 MMOL/L
CREAT SERPL-MCNC: 1.46 MG/DL
EGFR: 54 ML/MIN/1.73M2
EOSINOPHIL # BLD AUTO: 0.19 K/UL
EOSINOPHIL NFR BLD AUTO: 2.6 %
GLUCOSE SERPL-MCNC: 107 MG/DL
HCT VFR BLD CALC: 45.7 %
HGB BLD-MCNC: 13.9 G/DL
IMM GRANULOCYTES NFR BLD AUTO: 0.4 %
LYMPHOCYTES # BLD AUTO: 1.24 K/UL
LYMPHOCYTES NFR BLD AUTO: 17.1 %
MAN DIFF?: NORMAL
MCHC RBC-ENTMCNC: 28.9 PG
MCHC RBC-ENTMCNC: 30.4 GM/DL
MCV RBC AUTO: 95 FL
MONOCYTES # BLD AUTO: 0.52 K/UL
MONOCYTES NFR BLD AUTO: 7.2 %
NEUTROPHILS # BLD AUTO: 5.23 K/UL
NEUTROPHILS NFR BLD AUTO: 71.9 %
PLATELET # BLD AUTO: 285 K/UL
POTASSIUM SERPL-SCNC: 4.7 MMOL/L
RBC # BLD: 4.81 M/UL
RBC # FLD: 14.9 %
SARS-COV-2 N GENE NPH QL NAA+PROBE: NOT DETECTED
SODIUM SERPL-SCNC: 137 MMOL/L
WBC # FLD AUTO: 7.27 K/UL

## 2022-10-27 ENCOUNTER — OUTPATIENT (OUTPATIENT)
Dept: OUTPATIENT SERVICES | Facility: HOSPITAL | Age: 62
LOS: 1 days | End: 2022-10-27
Payer: MEDICARE

## 2022-10-27 DIAGNOSIS — Z98.890 OTHER SPECIFIED POSTPROCEDURAL STATES: Chronic | ICD-10-CM

## 2022-10-28 ENCOUNTER — TRANSCRIPTION ENCOUNTER (OUTPATIENT)
Age: 62
End: 2022-10-28

## 2022-10-28 PROCEDURE — 85025 COMPLETE CBC W/AUTO DIFF WBC: CPT

## 2022-10-28 PROCEDURE — 83036 HEMOGLOBIN GLYCOSYLATED A1C: CPT

## 2022-10-28 PROCEDURE — C9602: CPT | Mod: RC

## 2022-10-28 PROCEDURE — C1894: CPT

## 2022-10-28 PROCEDURE — 36415 COLL VENOUS BLD VENIPUNCTURE: CPT

## 2022-10-28 PROCEDURE — 92978 ENDOLUMINL IVUS OCT C 1ST: CPT | Mod: RC

## 2022-10-28 PROCEDURE — C1885: CPT

## 2022-10-28 PROCEDURE — C1769: CPT

## 2022-10-28 PROCEDURE — C1753: CPT

## 2022-10-28 PROCEDURE — 94640 AIRWAY INHALATION TREATMENT: CPT

## 2022-10-28 PROCEDURE — C1725: CPT

## 2022-10-28 PROCEDURE — 80053 COMPREHEN METABOLIC PANEL: CPT

## 2022-10-28 PROCEDURE — 93458 L HRT ARTERY/VENTRICLE ANGIO: CPT | Mod: 59

## 2022-10-28 PROCEDURE — 93005 ELECTROCARDIOGRAM TRACING: CPT

## 2022-10-28 PROCEDURE — C1887: CPT

## 2022-10-28 PROCEDURE — C1760: CPT

## 2022-10-28 PROCEDURE — C1761: CPT

## 2022-10-31 ENCOUNTER — APPOINTMENT (OUTPATIENT)
Dept: CARDIOLOGY | Facility: CLINIC | Age: 62
End: 2022-10-31

## 2022-10-31 ENCOUNTER — NON-APPOINTMENT (OUTPATIENT)
Age: 62
End: 2022-10-31

## 2022-10-31 VITALS
HEART RATE: 102 BPM | SYSTOLIC BLOOD PRESSURE: 124 MMHG | HEIGHT: 71 IN | BODY MASS INDEX: 29.68 KG/M2 | OXYGEN SATURATION: 98 % | WEIGHT: 212 LBS | DIASTOLIC BLOOD PRESSURE: 66 MMHG | TEMPERATURE: 96.8 F

## 2022-10-31 PROBLEM — I71.20 THORACIC AORTIC ANEURYSM, WITHOUT RUPTURE, UNSPECIFIED: Chronic | Status: ACTIVE | Noted: 2022-10-26

## 2022-10-31 PROBLEM — K21.9 GASTRO-ESOPHAGEAL REFLUX DISEASE WITHOUT ESOPHAGITIS: Chronic | Status: ACTIVE | Noted: 2022-10-26

## 2022-10-31 PROBLEM — I73.9 PERIPHERAL VASCULAR DISEASE, UNSPECIFIED: Chronic | Status: ACTIVE | Noted: 2022-10-26

## 2022-10-31 PROBLEM — I21.9 ACUTE MYOCARDIAL INFARCTION, UNSPECIFIED: Chronic | Status: ACTIVE | Noted: 2022-10-26

## 2022-10-31 PROBLEM — I10 ESSENTIAL (PRIMARY) HYPERTENSION: Chronic | Status: ACTIVE | Noted: 2022-10-26

## 2022-10-31 PROBLEM — E78.5 HYPERLIPIDEMIA, UNSPECIFIED: Chronic | Status: ACTIVE | Noted: 2022-10-26

## 2022-10-31 PROBLEM — I25.10 ATHEROSCLEROTIC HEART DISEASE OF NATIVE CORONARY ARTERY WITHOUT ANGINA PECTORIS: Chronic | Status: ACTIVE | Noted: 2022-10-26

## 2022-10-31 PROBLEM — A69.20 LYME DISEASE, UNSPECIFIED: Chronic | Status: ACTIVE | Noted: 2022-10-26

## 2022-10-31 PROCEDURE — 99214 OFFICE O/P EST MOD 30 MIN: CPT

## 2022-10-31 PROCEDURE — 93000 ELECTROCARDIOGRAM COMPLETE: CPT

## 2022-10-31 NOTE — DISCUSSION/SUMMARY
[FreeTextEntry1] : \par CAD with recent unstable angina due to mid RCA ISR mechanism underexpansion with minimal luminal gain after high-pressure noncompliant ballooning and scoring balloon necessitating laser atherectomy.  Expansion was limited by fibrotic tissue therefore lithotripsy will not have significant benefit.\par \par He then underwent laser atherectomy/shockwave/Cutting Balloon angioplasty with adequate result.  He will be set up for brachytherapy within a month at [place].\par \par He feels well.  Left CFA access well-healing.  Compliant with dual antiplatelet therapy.  No further chest pain.\par \par I will see him in 3 months with comprehensive labwork.  ER precautions given to patient.\par

## 2022-10-31 NOTE — HISTORY OF PRESENT ILLNESS
[FreeTextEntry1] : \par 61 yo M\par CAD with recent unstable angina due to mid RCA ISR mechanism underexpansion with minimal luminal gain after high-pressure noncompliant ballooning and scoring balloon necessitating laser atherectomy.  Expansion was limited by fibrotic tissue therefore lithotripsy will not have significant benefit.\par \par He then underwent laser atherectomy/shockwave/Cutting Balloon angioplasty with adequate result.  He will be set up for brachytherapy within a month at [place].\par \par He feels well.  Left CFA access well-healing.  Compliant with dual antiplatelet therapy.  No further chest pain.

## 2022-11-02 DIAGNOSIS — R07.89 OTHER CHEST PAIN: ICD-10-CM

## 2022-11-05 ENCOUNTER — NON-APPOINTMENT (OUTPATIENT)
Age: 62
End: 2022-11-05

## 2022-11-11 ENCOUNTER — APPOINTMENT (OUTPATIENT)
Dept: CARDIOLOGY | Facility: CLINIC | Age: 62
End: 2022-11-11

## 2022-11-11 ENCOUNTER — NON-APPOINTMENT (OUTPATIENT)
Age: 62
End: 2022-11-11

## 2022-11-11 VITALS
WEIGHT: 212 LBS | BODY MASS INDEX: 29.68 KG/M2 | HEIGHT: 71 IN | TEMPERATURE: 97.8 F | DIASTOLIC BLOOD PRESSURE: 62 MMHG | OXYGEN SATURATION: 97 % | HEART RATE: 94 BPM | SYSTOLIC BLOOD PRESSURE: 120 MMHG

## 2022-11-11 PROCEDURE — 93000 ELECTROCARDIOGRAM COMPLETE: CPT

## 2022-11-11 PROCEDURE — 99214 OFFICE O/P EST MOD 30 MIN: CPT

## 2022-11-11 RX ORDER — OLMESARTAN MEDOXOMIL 40 MG/1
40 TABLET, FILM COATED ORAL DAILY
Qty: 90 | Refills: 0 | Status: DISCONTINUED | COMMUNITY
End: 2022-11-11

## 2022-11-11 NOTE — DISCUSSION/SUMMARY
[FreeTextEntry1] : \par add on appointment for chest pain. very atypical right and left sided last minutes 3 times worse with moving arms not exertional still active. ekg unchanged.\par \par # he will get brachytherapy within a month post intervention. - continue dapt and optimal medical therapy. \par \par I will see him in 3 months with comprehensive labwork.  ER precautions given to patient.\par  DC instructions

## 2022-11-11 NOTE — HISTORY OF PRESENT ILLNESS
[FreeTextEntry1] : \par 61 yo M\par \par 11/2022: add on appointment for chest pain. very atypical right and left sided last minutes 3 times worse with moving arms not exertional still active. ekg unchanged.\par \par he had laser/shockwave/cutting 10/27/22 with good result of mid RCA ISR. He was to be set up for brachytherapy within a month at [place]. \par \par 10/2022: CAD with recent unstable angina due to mid RCA ISR mechanism underexpansion with minimal luminal gain after high-pressure noncompliant ballooning and scoring balloon necessitating laser atherectomy.  Expansion was limited by fibrotic tissue.

## 2022-11-14 DIAGNOSIS — Z01.818 ENCOUNTER FOR OTHER PREPROCEDURAL EXAMINATION: ICD-10-CM

## 2022-11-15 LAB — SARS-COV-2 N GENE NPH QL NAA+PROBE: NOT DETECTED

## 2022-11-16 ENCOUNTER — TRANSCRIPTION ENCOUNTER (OUTPATIENT)
Age: 62
End: 2022-11-16

## 2022-11-16 ENCOUNTER — OUTPATIENT (OUTPATIENT)
Dept: OUTPATIENT SERVICES | Facility: HOSPITAL | Age: 62
LOS: 1 days | End: 2022-11-16
Payer: MEDICARE

## 2022-11-16 VITALS
WEIGHT: 212.08 LBS | SYSTOLIC BLOOD PRESSURE: 109 MMHG | DIASTOLIC BLOOD PRESSURE: 66 MMHG | OXYGEN SATURATION: 97 % | HEART RATE: 110 BPM | RESPIRATION RATE: 19 BRPM | HEIGHT: 71 IN | TEMPERATURE: 98 F

## 2022-11-16 VITALS
DIASTOLIC BLOOD PRESSURE: 80 MMHG | SYSTOLIC BLOOD PRESSURE: 133 MMHG | OXYGEN SATURATION: 100 % | HEART RATE: 92 BPM | TEMPERATURE: 98 F | RESPIRATION RATE: 18 BRPM

## 2022-11-16 DIAGNOSIS — I25.10 ATHEROSCLEROTIC HEART DISEASE OF NATIVE CORONARY ARTERY WITHOUT ANGINA PECTORIS: ICD-10-CM

## 2022-11-16 DIAGNOSIS — Z98.890 OTHER SPECIFIED POSTPROCEDURAL STATES: Chronic | ICD-10-CM

## 2022-11-16 DIAGNOSIS — Z95.5 PRESENCE OF CORONARY ANGIOPLASTY IMPLANT AND GRAFT: Chronic | ICD-10-CM

## 2022-11-16 LAB
ALBUMIN SERPL ELPH-MCNC: 4.2 G/DL — SIGNIFICANT CHANGE UP (ref 3.3–5)
ALP SERPL-CCNC: 125 U/L — HIGH (ref 40–120)
ALT FLD-CCNC: 38 U/L — SIGNIFICANT CHANGE UP (ref 10–45)
ANION GAP SERPL CALC-SCNC: 11 MMOL/L — SIGNIFICANT CHANGE UP (ref 5–17)
AST SERPL-CCNC: 26 U/L — SIGNIFICANT CHANGE UP (ref 10–40)
BILIRUB SERPL-MCNC: 0.3 MG/DL — SIGNIFICANT CHANGE UP (ref 0.2–1.2)
BUN SERPL-MCNC: 19 MG/DL — SIGNIFICANT CHANGE UP (ref 7–23)
CALCIUM SERPL-MCNC: 9.3 MG/DL — SIGNIFICANT CHANGE UP (ref 8.4–10.5)
CHLORIDE SERPL-SCNC: 104 MMOL/L — SIGNIFICANT CHANGE UP (ref 96–108)
CO2 SERPL-SCNC: 24 MMOL/L — SIGNIFICANT CHANGE UP (ref 22–31)
CREAT SERPL-MCNC: 1.37 MG/DL — HIGH (ref 0.5–1.3)
EGFR: 59 ML/MIN/1.73M2 — LOW
GLUCOSE SERPL-MCNC: 113 MG/DL — HIGH (ref 70–99)
HCT VFR BLD CALC: 41.6 % — SIGNIFICANT CHANGE UP (ref 39–50)
HGB BLD-MCNC: 13.2 G/DL — SIGNIFICANT CHANGE UP (ref 13–17)
MCHC RBC-ENTMCNC: 28.3 PG — SIGNIFICANT CHANGE UP (ref 27–34)
MCHC RBC-ENTMCNC: 31.7 GM/DL — LOW (ref 32–36)
MCV RBC AUTO: 89.1 FL — SIGNIFICANT CHANGE UP (ref 80–100)
NRBC # BLD: 0 /100 WBCS — SIGNIFICANT CHANGE UP (ref 0–0)
PLATELET # BLD AUTO: 262 K/UL — SIGNIFICANT CHANGE UP (ref 150–400)
POTASSIUM SERPL-MCNC: 4.4 MMOL/L — SIGNIFICANT CHANGE UP (ref 3.5–5.3)
POTASSIUM SERPL-SCNC: 4.4 MMOL/L — SIGNIFICANT CHANGE UP (ref 3.5–5.3)
PROT SERPL-MCNC: 7.2 G/DL — SIGNIFICANT CHANGE UP (ref 6–8.3)
RBC # BLD: 4.67 M/UL — SIGNIFICANT CHANGE UP (ref 4.2–5.8)
RBC # FLD: 14.5 % — SIGNIFICANT CHANGE UP (ref 10.3–14.5)
SODIUM SERPL-SCNC: 139 MMOL/L — SIGNIFICANT CHANGE UP (ref 135–145)
WBC # BLD: 8.08 K/UL — SIGNIFICANT CHANGE UP (ref 3.8–10.5)
WBC # FLD AUTO: 8.08 K/UL — SIGNIFICANT CHANGE UP (ref 3.8–10.5)

## 2022-11-16 PROCEDURE — C1717: CPT

## 2022-11-16 PROCEDURE — 92920 PRQ TRLUML C ANGIOP 1ART&/BR: CPT | Mod: RC

## 2022-11-16 PROCEDURE — 77290 THER RAD SIMULAJ FIELD CPLX: CPT

## 2022-11-16 PROCEDURE — 93010 ELECTROCARDIOGRAM REPORT: CPT

## 2022-11-16 PROCEDURE — 92974 CATH PLACE CARDIO BRACHYTX: CPT

## 2022-11-16 PROCEDURE — 77290 THER RAD SIMULAJ FIELD CPLX: CPT | Mod: 26

## 2022-11-16 PROCEDURE — 77316 BRACHYTX ISODOSE PLAN SIMPLE: CPT | Mod: 26

## 2022-11-16 PROCEDURE — 80053 COMPREHEN METABOLIC PANEL: CPT

## 2022-11-16 PROCEDURE — 77770 HDR RDNCL NTRSTL/ICAV BRCHTX: CPT

## 2022-11-16 PROCEDURE — 77470 SPECIAL RADIATION TREATMENT: CPT

## 2022-11-16 PROCEDURE — 99221 1ST HOSP IP/OBS SF/LOW 40: CPT | Mod: 25

## 2022-11-16 PROCEDURE — C1725: CPT

## 2022-11-16 PROCEDURE — 93005 ELECTROCARDIOGRAM TRACING: CPT

## 2022-11-16 PROCEDURE — 77263 THER RADIOLOGY TX PLNG CPLX: CPT

## 2022-11-16 PROCEDURE — 77316 BRACHYTX ISODOSE PLAN SIMPLE: CPT

## 2022-11-16 PROCEDURE — 77770 HDR RDNCL NTRSTL/ICAV BRCHTX: CPT | Mod: 26

## 2022-11-16 PROCEDURE — 77470 SPECIAL RADIATION TREATMENT: CPT | Mod: 26

## 2022-11-16 PROCEDURE — 36415 COLL VENOUS BLD VENIPUNCTURE: CPT

## 2022-11-16 PROCEDURE — 85027 COMPLETE CBC AUTOMATED: CPT

## 2022-11-16 PROCEDURE — C1728: CPT

## 2022-11-16 PROCEDURE — 77370 RADIATION PHYSICS CONSULT: CPT

## 2022-11-16 PROCEDURE — C1769: CPT

## 2022-11-16 PROCEDURE — 99152 MOD SED SAME PHYS/QHP 5/>YRS: CPT

## 2022-11-16 PROCEDURE — C1894: CPT

## 2022-11-16 PROCEDURE — C1887: CPT

## 2022-11-16 RX ORDER — SODIUM CHLORIDE 9 MG/ML
250 INJECTION INTRAMUSCULAR; INTRAVENOUS; SUBCUTANEOUS ONCE
Refills: 0 | Status: COMPLETED | OUTPATIENT
Start: 2022-11-16 | End: 2022-11-16

## 2022-11-16 RX ORDER — FLUTICASONE PROPIONATE 220 MCG
1 AEROSOL WITH ADAPTER (GRAM) INHALATION
Qty: 0 | Refills: 0 | DISCHARGE

## 2022-11-16 RX ORDER — TICAGRELOR 90 MG/1
1 TABLET ORAL
Qty: 180 | Refills: 3
Start: 2022-11-16 | End: 2023-11-10

## 2022-11-16 RX ORDER — SODIUM CHLORIDE 9 MG/ML
1000 INJECTION INTRAMUSCULAR; INTRAVENOUS; SUBCUTANEOUS
Refills: 0 | Status: DISCONTINUED | OUTPATIENT
Start: 2022-11-16 | End: 2022-11-30

## 2022-11-16 RX ORDER — OLMESARTAN MEDOXOMIL 5 MG/1
1 TABLET, FILM COATED ORAL
Qty: 0 | Refills: 0 | DISCHARGE

## 2022-11-16 RX ADMIN — SODIUM CHLORIDE 75 MILLILITER(S): 9 INJECTION INTRAMUSCULAR; INTRAVENOUS; SUBCUTANEOUS at 09:30

## 2022-11-16 RX ADMIN — SODIUM CHLORIDE 150 MILLILITER(S): 9 INJECTION INTRAMUSCULAR; INTRAVENOUS; SUBCUTANEOUS at 13:31

## 2022-11-16 RX ADMIN — SODIUM CHLORIDE 750 MILLILITER(S): 9 INJECTION INTRAMUSCULAR; INTRAVENOUS; SUBCUTANEOUS at 09:09

## 2022-11-16 NOTE — ASU PREOP CHECKLIST - ASSESSMENT, HISTORY & PHYSICAL COMPLETED AND ON MEDICAL RECORD
"-- DO NOT REPLY / DO NOT REPLY ALL --  -- Message is from the [facility] if Available for the Visit Type? Yes    Caller is requesting an appointment - at a sooner time than what was available. Nicole Boyle wants sooner appointment - offered trusted partner & sister site            Patient is willing to be seen by: PCP only    Reason for Visit: [facility] Discharge follow up    Is the patient currently scheduled? Yes 11/29/2022    Preferred time to be seen: as soon as possible    Caller Information       Type Contact Phone    05/18/2022 09:52 AM CDT Phone (Incoming) [facility]  ([facility]) [phone]          Alternative phone number: [phone]    Turnaround time given to caller: ""This message will be sent to [facility] Provider's name]. The clinical team will fulfill your request as soon as they review your message. \""  " done

## 2022-11-16 NOTE — ASU DISCHARGE PLAN (ADULT/PEDIATRIC) - CARE PROVIDER_API CALL
Bacilio Chan)  Rhame Cardio  [address] [place] [address]  Phone: [phone]  Fax: [phone]  Follow Up Time:

## 2022-11-16 NOTE — H&P CARDIOLOGY - HISTORY OF PRESENT ILLNESS
62M PMH MI, CAD s/p PCI with stent placement prox to mid LAD and D1 and to prox/mid RCA with significant ISR requiring balloon angioplasty and HOMER in (9/2/21), Thoracic aortic aneurysm, HTN, HLD, GERD, depression, PVD, Sinus tachycardia, thyroid disorder, Lyme disease with complaints of recurrent chest pain and shortness of breath. Patient underwent cardiac catheterization on 10/21/22 revealing Mid RCA ISR lesion culprit for unstable angina and mechanism underexpansion with minimal luminal gain after high-pressure noncompliant ballooning and scoring balloon necessitating laser atherectomy. Expansion is limited by fibrotic tissue therefore lithotripsy would not have had significant benefit. At [facility] Staged PCI of m RCA ISR- laser/shockwave/cutting 10/27/22 with good result of mid RCA ISR. Pt reports chest pain very atypical right and left sided last minutes 3 times worse with moving arms not exertional still active. Pt presents today for Brachytherapy by Dr Bacilio Chan.     TTE 9/2/21, EF: LVEF 60%       62M PMH MI 2014, CAD s/p PCI with Stent placement prox to mid LAD and D1 and to prox/mid RCA with significant ISR requiring balloon angioplasty and HOMER in (9/2/21), Thoracic aortic aneurysm, HTN, HLD, GERD, depression, PVD, Sinus tachycardia, thyroid disorder, Lyme disease with complaints of recurrent chest pain and shortness of breath. Patient underwent cardiac catheterization on 10/21/22 revealing Mid RCA ISR lesion culprit for unstable angina and mechanism underexpansion with minimal luminal gain after high-pressure noncompliant ballooning and scoring balloon necessitating laser atherectomy. Expansion is limited by fibrotic tissue therefore lithotripsy would not have had significant benefit. At [facility] Staged PCI of m RCA ISR- laser/shockwave/cutting 10/27/22 with good result of mid RCA ISR. Pt reports chest pain very atypical right and left sided last minutes 3 times worse with moving arms not exertional still active. Pt presents today for Brachytherapy by Dr Bacilio Chan.     TTE 9/2/21, EF: LVEF 60%

## 2022-11-16 NOTE — ASU DISCHARGE PLAN (ADULT/PEDIATRIC) - ASU DC SPECIAL INSTRUCTIONSFT

## 2022-11-16 NOTE — DISCHARGE NOTE NURSING/CASE MANAGEMENT/SOCIAL WORK - NSDCPEFALRISK_GEN_ALL_CORE
For information on Fall & Injury Prevention, visit: [email or web address] OR  [email or web address] OR  https://www.cdc.gov/steadi/patient.html

## 2022-11-16 NOTE — H&P CARDIOLOGY - NSICDXFAMILYHX_GEN_ALL_CORE_FT
FAMILY HISTORY:  Father  Still living? Unknown  FH: Parkinson's disease, Age at diagnosis: Age Unknown    Mother  Still living? Unknown  FH: abdominal aortic aneurysm, Age at diagnosis: Age Unknown

## 2022-11-16 NOTE — CHART NOTE - NSCHARTNOTEFT_GEN_A_CORE
Removal of Femoral Sheath    Pulses in the (right/left) lower extremity are (palpable +2). The patient was placed in the supine position. The insertion site was identified and the sutures were removed per protocol.  The __6__ [place] femoral sheath was then removed. Direct pressure was applied for  ____20__ minutes.     Monitoring of the (right) groin and both lower extremities including neuro-vascular checks and vital signs every 15 minutes x 4, then every 30 minutes x 2, then every 1 hour was ordered.    Complications: None

## 2022-11-16 NOTE — DISCHARGE NOTE NURSING/CASE MANAGEMENT/SOCIAL WORK - PATIENT PORTAL LINK FT
You can access the FollowMyHealth Patient Portal offered by [facility] by registering at the following website: [email or web address]. By joining [employer]’s FollowMyHealth portal, you will also be able to view your health information using other applications (apps) compatible with our system.

## 2022-11-16 NOTE — H&P CARDIOLOGY - NSICDXPASTSURGICALHX_GEN_ALL_CORE_FT
PAST SURGICAL HISTORY:  H/O hernia repair      PAST SURGICAL HISTORY:  H/O hernia repair     History of coronary artery stent placement

## 2022-11-16 NOTE — H&P CARDIOLOGY - NSICDXPASTMEDICALHX_GEN_ALL_CORE_FT
PAST MEDICAL HISTORY:  CAD (coronary artery disease) stent placement prox to mid LAD and D1 and to prox/mid RCA with significant ISR requiring balloon angioplasty and HOMER in (9/2/21)    GERD (gastroesophageal reflux disease)     HLD (hyperlipidemia)     HTN (hypertension)     Lyme disease     Myocardial infarction     PVD (peripheral vascular disease)     Thoracic aortic aneurysm (TAA)

## 2022-11-16 NOTE — PATIENT PROFILE ADULT - NSPRESCRALCSIXMORE_GEN_A_NUR
Never Xolair Pregnancy And Lactation Text: This medication is Pregnancy Category B and is considered safe during pregnancy. This medication is excreted in breast milk.

## 2022-11-28 ENCOUNTER — APPOINTMENT (OUTPATIENT)
Dept: CARDIOLOGY | Facility: CLINIC | Age: 62
End: 2022-11-28

## 2022-11-28 ENCOUNTER — RESULT REVIEW (OUTPATIENT)
Age: 62
End: 2022-11-28

## 2022-11-28 ENCOUNTER — NON-APPOINTMENT (OUTPATIENT)
Age: 62
End: 2022-11-28

## 2022-11-28 VITALS
BODY MASS INDEX: 30.1 KG/M2 | WEIGHT: 215 LBS | HEIGHT: 71 IN | TEMPERATURE: 98.4 F | OXYGEN SATURATION: 94 % | HEART RATE: 100 BPM | SYSTOLIC BLOOD PRESSURE: 118 MMHG | DIASTOLIC BLOOD PRESSURE: 62 MMHG

## 2022-11-28 PROCEDURE — 99215 OFFICE O/P EST HI 40 MIN: CPT

## 2022-11-28 PROCEDURE — 93000 ELECTROCARDIOGRAM COMPLETE: CPT

## 2022-11-28 RX ORDER — ASPIRIN ENTERIC COATED TABLETS 81 MG 81 MG/1
81 TABLET, DELAYED RELEASE ORAL DAILY
Qty: 90 | Refills: 1 | Status: ACTIVE | COMMUNITY

## 2022-11-28 NOTE — HISTORY OF PRESENT ILLNESS
[FreeTextEntry1] : \par 11/28/2022: underwent brachytherapy RCA. doing well. ekg unchanged. surveillance pending. compliant DAPT. no bleeding. no chest pain. \par \par 11/2022: add on appointment for chest pain. very atypical right and left sided last minutes 3 times worse with moving arms not exertional still active. ekg unchanged.\par \par he had laser/shockwave/cutting 10/27/22 with good result of mid RCA ISR. He was to be set up for brachytherapy within a month at [place]. \par \par 10/2022: CAD with recent unstable angina due to mid RCA ISR mechanism underexpansion with minimal luminal gain after high-pressure noncompliant ballooning and scoring balloon necessitating laser atherectomy.  Expansion was limited by fibrotic tissue.

## 2022-11-28 NOTE — DISCUSSION/SUMMARY
[FreeTextEntry1] : \par # CAD with recent unstable angina due to mid RCA ISR now with shockwave/laser/cutting balloon and brachytherapy:\par - dapt long term as tolerated given multiple stents including LAD/Diag\par - high potency statin\par - serial labwork\par - cardiac rehab [facility]\par \par # Thoracic aorta aneurysm: \par - yearly CTA chest and AAA u/s. ordered 6 months. \par - TTE 6 months\par \par \par Follow 6 months, testing. ER precautions given to patient.\par

## 2023-01-10 ENCOUNTER — NON-APPOINTMENT (OUTPATIENT)
Age: 63
End: 2023-01-10

## 2023-01-11 ENCOUNTER — APPOINTMENT (OUTPATIENT)
Dept: CARDIOLOGY | Facility: CLINIC | Age: 63
End: 2023-01-11
Payer: MEDICARE

## 2023-01-11 VITALS
SYSTOLIC BLOOD PRESSURE: 116 MMHG | HEIGHT: 71 IN | BODY MASS INDEX: 30.24 KG/M2 | HEART RATE: 96 BPM | DIASTOLIC BLOOD PRESSURE: 70 MMHG | WEIGHT: 216 LBS | OXYGEN SATURATION: 63 % | TEMPERATURE: 98.2 F

## 2023-01-11 DIAGNOSIS — R00.0 TACHYCARDIA, UNSPECIFIED: ICD-10-CM

## 2023-01-11 PROCEDURE — 99215 OFFICE O/P EST HI 40 MIN: CPT

## 2023-01-11 NOTE — DISCUSSION/SUMMARY
[FreeTextEntry1] : \par # Sent from cardiac rehab for hypotension BP as low as 70/42, currently on olmesartan 30 Mg daily.  Olmesartan reduced to 10 Mg daily with follow-up BPs prior to dose.  Discussed moderate exercise, low salt diet, avoidance of alcohol and caffeine.\par \par # CAD with recent unstable angina due to mid RCA ISR now with shockwave/laser/cutting balloon and brachytherapy:\par - dapt long term as tolerated given multiple stents including LAD/Diag\par - high potency statin\par - serial labwork\par - cardiac rehab [facility]\par \par # Thoracic aorta aneurysm: \par - yearly CTA chest and AAA u/s. ordered 6 months. \par - TTE 6 months\par \par \par Follow 6 months, testing. ER precautions given to patient.\par

## 2023-01-11 NOTE — HISTORY OF PRESENT ILLNESS
[FreeTextEntry1] : Dominic is a 62-year-old male with history of HTN, HL, CAD, complex PCI mRCA 10/27/22 laser, cutting balloon, brachytherapy Dr. Chan, angina.\par \par Patient sent from cardiac rehab for hypotension BP as low as 70/42, currently on olmesartan 30 Mg daily.  Olmesartan reduced to 10 Mg daily with follow-up BPs prior to dose. \par \par No history of  VHD, CHF, TIA, CVA, diabetes, PVD, DVT, PE, arrhythmia, AF.\par \par Cardiovascular review of symptoms is negative for exertional chest pain, dyspnea, palpitations, dizziness or syncope.  No PND or orthopnea leg edema.  No bleeding or black stool.  Patient has atypical chest pain at times left axillary brief sharp pain.\par \par Patient is participating in cardiac rehab without exertional symptoms.

## 2023-01-11 NOTE — CARDIOLOGY SUMMARY
[de-identified] : 07/6/2022, NSR, poor R wave progression, left axis deviation. [de-identified] : Nuclear stress test 10/18/2021 no ischemia or infarct [de-identified] : 9/2/2021 Ef 60%, borderline LVH [de-identified] : Cath with AP 9/2/2021 - PCI

## 2023-01-11 NOTE — ASSESSMENT
[FreeTextEntry1] : HECTOR BONILLA  is a 62 year M  who presents today Oct 18, 2022 with the above history and the following active issues. \par \par CAD now with ongoing intermittent chest pain and pressure over the past week. Symptoms are concerning given his past history. Recommend left heart cath at [facility]. He will continue DAPT. Use Ntg as needed. If recurrent symptoms ER evaluation. Continue statin.\par EKG today with NSR and no significant new ST-T wave changes.\par \par Hypertension, blood pressure controlled on my assessment 112/78. Continue increased dose of Olmesartan. Low sodium diet. \par \par HLD continue statin. Lifestyle and risk factor modification. \par \par Red flag symptoms which would warrant sooner emergent evaluation reviewed with the patient. \par Questions and concerns were addressed and answered. \par Limitations of non-invasive testing reviewed.\par \par Sincerely,\par \par Valentina Caldera PA-C\par Patients history, testing and plan reviewed with supervising MD: Dr. Mireles

## 2023-01-12 ENCOUNTER — APPOINTMENT (OUTPATIENT)
Dept: ORTHOPEDIC SURGERY | Facility: CLINIC | Age: 63
End: 2023-01-12
Payer: MEDICARE

## 2023-01-12 DIAGNOSIS — M12.571 TRAUMATIC ARTHROPATHY, RIGHT ANKLE AND FOOT: ICD-10-CM

## 2023-01-12 DIAGNOSIS — M25.571 PAIN IN RIGHT ANKLE AND JOINTS OF RIGHT FOOT: ICD-10-CM

## 2023-01-12 PROCEDURE — 99213 OFFICE O/P EST LOW 20 MIN: CPT

## 2023-01-12 NOTE — DISCUSSION/SUMMARY
[de-identified] : reviewed findings, anatomy and pathology  discussed and pt understands. questions answered, pt left pleased\par

## 2023-01-12 NOTE — PHYSICAL EXAM
[5___] : [facility] 5[unfilled]/5 [Normal] : saphenous nerve sensation normal [Right] : right ankle [Degenerative change] : Degenerative change [Weight -] : weightbearing [] : no gross deformity [FreeTextEntry3] : General ache  [FreeTextEntry9] : ct Traumatic Arthritis  [de-identified] : plantar flexion 20 degrees [de-identified] : inversion 0 degrees [de-identified] : eversion 0 degrees [TWNoteComboBox7] : dorsiflexion 0 degrees

## 2023-01-19 ENCOUNTER — NON-APPOINTMENT (OUTPATIENT)
Age: 63
End: 2023-01-19

## 2023-01-20 ENCOUNTER — NON-APPOINTMENT (OUTPATIENT)
Age: 63
End: 2023-01-20

## 2023-01-23 ENCOUNTER — NON-APPOINTMENT (OUTPATIENT)
Age: 63
End: 2023-01-23

## 2023-01-25 ENCOUNTER — APPOINTMENT (OUTPATIENT)
Dept: ORTHOPEDIC SURGERY | Facility: CLINIC | Age: 63
End: 2023-01-25

## 2023-04-02 ENCOUNTER — NON-APPOINTMENT (OUTPATIENT)
Age: 63
End: 2023-04-02

## 2023-04-08 ENCOUNTER — NON-APPOINTMENT (OUTPATIENT)
Age: 63
End: 2023-04-08

## 2023-04-09 ENCOUNTER — NON-APPOINTMENT (OUTPATIENT)
Age: 63
End: 2023-04-09

## 2023-04-10 ENCOUNTER — APPOINTMENT (OUTPATIENT)
Dept: CARDIOLOGY | Facility: CLINIC | Age: 63
End: 2023-04-10
Payer: MEDICARE

## 2023-04-10 VITALS
BODY MASS INDEX: 30.38 KG/M2 | HEART RATE: 99 BPM | SYSTOLIC BLOOD PRESSURE: 140 MMHG | WEIGHT: 217 LBS | HEIGHT: 71 IN | DIASTOLIC BLOOD PRESSURE: 80 MMHG | OXYGEN SATURATION: 97 %

## 2023-04-10 PROCEDURE — 99215 OFFICE O/P EST HI 40 MIN: CPT

## 2023-04-10 NOTE — HISTORY OF PRESENT ILLNESS
[FreeTextEntry1] : HECTOR BONILLA is a 63 year old male with a past medical history of HTN, HL, obstructive CAD 2014 with HOMER last 2014, CAD, complex PCI mRCA 10/27/22 laser, cutting balloon, brachytherapy Dr. Chan, angina. COPD, CKD, Depression. GERD. Depression. Chronic back pain 2/2 spinal stenosis. Aortic aneurysm. BPH.\par \par 10/2022: CAD with recent unstable angina due to mid RCA ISR mechanism underexpansion with minimal luminal gain after high-pressure noncompliant ballooning and scoring balloon necessitating laser atherectomy. Expansion was limited by fibrotic tissue. \par \par He had laser/shockwave/cutting 10/27/22 with good result of mid RCA ISR. He was to be set up for brachytherapy within a month at [place].\par \par 11/28/2022: underwent brachytherapy RCA. doing well. ekg unchanged. surveillance pending. compliant DAPT. \par \par Last seen 1/11/23. Presented to ER 4/2023 with low blood pressure. Cardiology was consulted for hypotension. The patient was at cardiac rehab when he finished his time on treadmill and VS were checked. They noted his HR was elevated and SBP 90's patient said they rechecked twice with no improvement. RRT called because patient was weak. Was given IVF and BP improved. Olmesartan dc'ed. Signed out AMA. Presents today for BP evaluation/HFU. Denies Chest pain. There is some intermittent SOB, occasional flutters, and dizziness and lightheadedness. Feels better off of the Olmesartan 5mg. Denies CP, syncope, claudication, and edema. Former smoker. Attends cardiac rehab.\par \par Testing:\par \par Labs 4/2023: Na 139, K 3.9, Cr 1.75, Ca 9.4, Mag 2, AST 92, ALT 53, Trops < 0.010 x 3, BNP < 5, WBC 7.78, Hgb 13.7, HCT 41.9, plt 265, \par \par CT chest/abd/pelvis w or without contrast 4/7/23: No acute pathology in the chest, abd, or pelvis. Other stable chronic findings with mild ectasia of the ascending thoracid aorta and main pulmonary artery enlargement. Recommend CXR correlation and follow up to exclude developing pneumonic process at the right base.\par \par CXR 4/7/23: No acute cardiopulmonary dz. \par \par EKG 4/7/23: SR at 84 bpm, RP interval 164 ms, QTc 453, LAD, PRWP, nonspecific ST-T wave abnormalities \par \par Labs 1/25/23: Trigs 125, HDL 42, LDL 39, Chol 106, TSH 2.33.\par \par Nuke 10/18/21: Lexiscan. Negative.

## 2023-04-10 NOTE — REVIEW OF SYSTEMS
[Dyspnea on exertion] : dyspnea during exertion [Palpitations] : palpitations [Negative] : Heme/Lymph [FreeTextEntry5] : dizziness/lightheadedness

## 2023-04-10 NOTE — DISCUSSION/SUMMARY
[FreeTextEntry1] : HECTOR BONILLA is a 63 year old M who presents today Apr 10, 2023 with the above history and the following active issues:\par \par SOB. LEUNG. Dizziness. Lightheadedness.\par Obtain a nuclear stress test to evaluate for evidence of myocardial ischemia. \par Obtain 2d echocardiogram to evaluate resting heart structure, valvular function, and LVEF.\par Obtain carotid ultrasound to evaluate for evidence of significant carotid atherosclerosis or obstruction.\par Obtain abdominal ultrasound to evaluate for aortic aneurysm or significant aortic atherosclerosis.\par Obtain a nuclear stress test to evaluate for evidence of myocardial ischemia.\par Obtain 7 day Zio monitor to determine presence of arrhythmias or significant pauses. \par \par Elevated LFTs in hospital. Repeat labs ordered.\par \par Noncardiac findings on Chest CT 4/2023 discussed. He is aware to f/u with PCP.\par \par CAD with recent unstable angina due to mid RCA ISR now with shockwave/laser/cutting balloon and brachytherapy:\par - dapt long term as tolerated given multiple stents including LAD/Diag\par Stents and interventions as above. On ASA and Brilinta. On Isosorbide. On statin/zetia.\par \par Thoracic aorta aneurysm: \par - yearly CTA chest and AAA u/s. ordered 11/2022 should be done 5/2023.\par - TTE ordered ordered 11/2022 should be done 5/2023.\par \par HTN: Intolerant to Olmesartan. HR 99. Remain off Olmesartan and start Toprol XL 25 mg daily. Continue Isosorbide.\par \par HLD: On statin.\par \par CKD: Repeaing labs to reassess renal fxn.\par \par F/U after testing to review results (echo, carotid, abd, nuke, labs, Zio, and repeat BP check).\par Discussed red flag symptoms, which would warrant sooner or emergent medical evaluation.\par Any questions and concerns were addressed and resolved.\par \par Sincerely,\par Luanne Turner [facility]-BC\par Patient's history, testing, and plan was reviewed with supervising physician, Dr. Bacilio Chan

## 2023-04-21 ENCOUNTER — APPOINTMENT (OUTPATIENT)
Dept: CARDIOLOGY | Facility: CLINIC | Age: 63
End: 2023-04-21
Payer: MEDICARE

## 2023-04-21 PROCEDURE — 93880 EXTRACRANIAL BILAT STUDY: CPT

## 2023-04-21 PROCEDURE — 93979 VASCULAR STUDY: CPT

## 2023-04-21 PROCEDURE — 93306 TTE W/DOPPLER COMPLETE: CPT

## 2023-04-23 ENCOUNTER — NON-APPOINTMENT (OUTPATIENT)
Age: 63
End: 2023-04-23

## 2023-04-24 ENCOUNTER — NON-APPOINTMENT (OUTPATIENT)
Age: 63
End: 2023-04-24

## 2023-04-25 ENCOUNTER — APPOINTMENT (OUTPATIENT)
Dept: CARDIOLOGY | Facility: CLINIC | Age: 63
End: 2023-04-25
Payer: MEDICARE

## 2023-04-25 ENCOUNTER — NON-APPOINTMENT (OUTPATIENT)
Age: 63
End: 2023-04-25

## 2023-04-25 PROCEDURE — A9502: CPT

## 2023-04-25 PROCEDURE — 93244 EXT ECG>48HR<7D REV&INTERPJ: CPT

## 2023-04-25 PROCEDURE — 78452 HT MUSCLE IMAGE SPECT MULT: CPT

## 2023-04-25 PROCEDURE — 93015 CV STRESS TEST SUPVJ I&R: CPT

## 2023-05-03 ENCOUNTER — NON-APPOINTMENT (OUTPATIENT)
Age: 63
End: 2023-05-03

## 2023-05-08 ENCOUNTER — APPOINTMENT (OUTPATIENT)
Dept: CARDIOLOGY | Facility: CLINIC | Age: 63
End: 2023-05-08
Payer: MEDICARE

## 2023-05-08 VITALS
HEIGHT: 71 IN | WEIGHT: 216 LBS | SYSTOLIC BLOOD PRESSURE: 116 MMHG | OXYGEN SATURATION: 96 % | DIASTOLIC BLOOD PRESSURE: 62 MMHG | BODY MASS INDEX: 30.24 KG/M2 | HEART RATE: 81 BPM

## 2023-05-08 PROCEDURE — 99215 OFFICE O/P EST HI 40 MIN: CPT

## 2023-05-08 RX ORDER — OLMESARTAN MEDOXOMIL 20 MG/1
20 TABLET, FILM COATED ORAL
Qty: 90 | Refills: 0 | Status: DISCONTINUED | COMMUNITY
Start: 2022-12-23

## 2023-05-08 RX ORDER — TACROLIMUS 1 MG/G
0.1 OINTMENT TOPICAL
Qty: 60 | Refills: 0 | Status: DISCONTINUED | COMMUNITY
Start: 2022-12-14

## 2023-05-08 RX ORDER — TRAZODONE HYDROCHLORIDE 100 MG/1
100 TABLET ORAL DAILY
Refills: 0 | Status: DISCONTINUED | COMMUNITY

## 2023-05-08 RX ORDER — FLUTICASONE PROPIONATE 50 UG/1
50 SPRAY, METERED NASAL
Refills: 0 | Status: DISCONTINUED | COMMUNITY
Start: 2022-06-10

## 2023-05-08 RX ORDER — OLMESARTAN MEDOXOMIL 5 MG/1
5 TABLET, FILM COATED ORAL
Qty: 180 | Refills: 1 | Status: DISCONTINUED | COMMUNITY
Start: 2022-09-30 | End: 2023-05-08

## 2023-05-09 ENCOUNTER — NON-APPOINTMENT (OUTPATIENT)
Age: 63
End: 2023-05-09

## 2023-05-09 NOTE — DISCUSSION/SUMMARY
[FreeTextEntry1] : \par labwork: cbc, lft, cr 1.75. was dehydrated. \par nuclear no perfusion issue. 6 METS\par abd u/s no organ pathology\par carotid doppler no significant disease but some calcified structure anterior to r cca \par echo: preserved ef. normal diastology. mild vhd\par 7 day zio average 73 bpm, no sig rhythm disturbance \par \par Noncardiac findings on Chest CT 4/2023 discussed. He is aware to f/u with PCP.\par \par # CAD with recent unstable angina due to mid RCA ISR now with shockwave/laser/cutting balloon and brachytherapy:\par - dapt long term as tolerated given multiple stents including LAD/Diag\par Stents and interventions as above. On ASA and Brilinta. On Isosorbide. On statin/zetia.\par - **** GIVEN MEMORY CHANGES: i will try off statin and get [id number] approved (intolerant). also refer to neurology. \par - restart cardiac rehab \par \par Thoracic aorta aneurysm: 4.3 cm. \par - yearly CTA chest and AAA u/s next 5/2024 \par \par HTN: Intolerant to Olmesartan. Remain off Olmesartan and keep Toprol XL 25 mg daily. Continue Isosorbide.\par \par HLD: On statin.\par \par CKD: serial labs. was dehydrated \par \par hiatal hernia\par \par Follow in 3 months with labwork. see neurology in interim.  ER precautions given to patient.\par \par Patient to restart cardiac and pulmonary rehab with same restrictions.\par

## 2023-05-09 NOTE — HISTORY OF PRESENT ILLNESS
[FreeTextEntry1] : \par 63 year old male with a past medical history of HTN, HL, obstructive CAD complex PCI mRCA 10/27/22 laser, cutting balloon, brachytherapy, angina. COPD, CKD, Depression. GERD. Depression. Chronic back pain 2/2 spinal stenosis. Aortic aneurysm. BPH.\par \par 5/2023 VISIT:\par main concern is memory changes since 2019 and word finding difficulties. \par interim testing\par labwork: cbc, lft, cr 1.75. was dehydrated. \par nuclear no perfusion issue. 6 METS\par abd u/s no organ pathology\par carotid doppler no significant disease but some calcified structure anterior to r cca \par echo: preserved ef. normal diastology. mild vhd\par 7 day zio average 73 bpm, no sig rhythm disturbance \par \par 4/2023 VISIT: ER 4/2023 with low blood pressure. Cardiology was consulted for hypotension. The patient was at cardiac rehab when he finished his time on treadmill and VS were checked. They noted his HR was elevated and SBP 90's patient said they rechecked twice with no improvement. RRT called because patient was weak. Was given IVF and BP improved. Olmesartan dc'ed. Signed out AMA. Presents today for BP evaluation/HFU. Denies Chest pain. There is some intermittent SOB, occasional flutters, and dizziness and lightheadedness. Feels better off of the Olmesartan.\par \par 11/28/2022: underwent brachytherapy RCA. doing well. ekg unchanged. surveillance pending. compliant DAPT. \par \par 10/2022: CAD with recent unstable angina due to mid RCA ISR mechanism underexpansion with minimal luminal gain after high-pressure noncompliant ballooning and scoring balloon necessitating laser atherectomy. Expansion was limited by fibrotic tissue. \par He had laser/shockwave/cutting 10/27/22 with good result of mid RCA ISR. He was to be set up for brachytherapy within a month at [place].\par \par Testing:\par \par Labs 4/2023: Na 139, K 3.9, Cr 1.75, Ca 9.4, Mag 2, AST 92, ALT 53, Trops < 0.010 x 3, BNP < 5, WBC 7.78, Hgb 13.7, HCT 41.9, plt 265, \par \par CT chest/abd/pelvis w or without contrast 4/7/23: No acute pathology in the chest, abd, or pelvis. Other stable chronic findings with mild ectasia of the ascending thoracid aorta and main pulmonary artery enlargement. Recommend CXR correlation and follow up to exclude developing pneumonic process at the right base.\par \par CXR 4/7/23: No acute cardiopulmonary dz. \par \par EKG 4/7/23: SR at 84 bpm, RP interval 164 ms, QTc 453, LAD, PRWP, nonspecific ST-T wave abnormalities \par \par Labs 1/25/23: Trigs 125, HDL 42, LDL 39, Chol 106, TSH 2.33.\par \par Nuke 10/18/21: Lexiscan. Negative.\par

## 2023-05-10 ENCOUNTER — NON-APPOINTMENT (OUTPATIENT)
Age: 63
End: 2023-05-10

## 2023-05-23 ENCOUNTER — NON-APPOINTMENT (OUTPATIENT)
Age: 63
End: 2023-05-23

## 2023-05-30 ENCOUNTER — APPOINTMENT (OUTPATIENT)
Dept: CARDIOLOGY | Facility: CLINIC | Age: 63
End: 2023-05-30

## 2023-06-05 ENCOUNTER — NON-APPOINTMENT (OUTPATIENT)
Age: 63
End: 2023-06-05

## 2023-06-07 ENCOUNTER — APPOINTMENT (OUTPATIENT)
Dept: CARDIOLOGY | Facility: CLINIC | Age: 63
End: 2023-06-07
Payer: MEDICARE

## 2023-06-07 ENCOUNTER — NON-APPOINTMENT (OUTPATIENT)
Age: 63
End: 2023-06-07

## 2023-06-07 VITALS
DIASTOLIC BLOOD PRESSURE: 80 MMHG | OXYGEN SATURATION: 97 % | BODY MASS INDEX: 30.66 KG/M2 | HEART RATE: 85 BPM | WEIGHT: 219 LBS | SYSTOLIC BLOOD PRESSURE: 126 MMHG | HEIGHT: 71 IN

## 2023-06-07 PROCEDURE — 99215 OFFICE O/P EST HI 40 MIN: CPT

## 2023-06-07 PROCEDURE — 93000 ELECTROCARDIOGRAM COMPLETE: CPT

## 2023-06-07 RX ORDER — TESTOSTERONE
POWDER (GRAM) MISCELLANEOUS
Refills: 0 | Status: ACTIVE | COMMUNITY

## 2023-06-07 NOTE — HISTORY OF PRESENT ILLNESS
[FreeTextEntry1] : \par 63 year old male with a past medical history of HTN, HL, obstructive CAD complex PCI mRCA 10/27/22 laser, cutting balloon, brachytherapy, angina. COPD, CKD, Depression. GERD. Depression. Chronic back pain 2/2 spinal stenosis. Aortic aneurysm. BPH. prior lad/diag stents. \par \par 6/2023 VISIT: interim multiple issues: fatigue, epigastric pain. randomly has heart burning twice in past 2 weeks lasting a few seconds. reports this started when on metoprolol. there is no exertional component. \par on praluent, off statin. \par is awaiting to restart cardiac rehab. \par \par 5/2023 VISIT:\par main concern is memory changes since 2019 and word finding difficulties. \par interim testing\par labwork: cbc, lft, cr 1.75. was dehydrated. \par nuclear no perfusion issue. 6 METS\par abd u/s no organ pathology\par carotid doppler no significant disease but some calcified structure anterior to r cca \par echo: preserved ef. normal diastology. mild vhd\par 7 day zio average 73 bpm, no sig rhythm disturbance \par \par 4/2023 VISIT: ER 4/2023 with low blood pressure. Cardiology was consulted for hypotension. The patient was at cardiac rehab when he finished his time on treadmill and VS were checked. They noted his HR was elevated and SBP 90's patient said they rechecked twice with no improvement. RRT called because patient was weak. Was given IVF and BP improved. Olmesartan dc'ed. Signed out AMA. Presents today for BP evaluation/HFU. Denies Chest pain. There is some intermittent SOB, occasional flutters, and dizziness and lightheadedness. Feels better off of the Olmesartan.\par \par 11/28/2022: underwent brachytherapy RCA. doing well. ekg unchanged. surveillance pending. compliant DAPT. \par \par 10/2022: CAD with recent unstable angina due to mid RCA ISR mechanism underexpansion with minimal luminal gain after high-pressure noncompliant ballooning and scoring balloon necessitating laser atherectomy. Expansion was limited by fibrotic tissue. \par He had laser/shockwave/cutting 10/27/22 with good result of mid RCA ISR. He was to be set up for brachytherapy within a month at [place].\par \par \par TESTING I REVIEWED TODAY excluding above:\par \par Labs 4/2023: Na 139, K 3.9, Cr 1.75, Ca 9.4, Mag 2, AST 92, ALT 53, Trops < 0.010 x 3, BNP < 5, WBC 7.78, Hgb 13.7, HCT 41.9, plt 265, \par \par CT chest/abd/pelvis w or without contrast 4/7/23: No acute pathology in the chest, abd, or pelvis. Other stable chronic findings with mild ectasia of the ascending thoracid aorta and main pulmonary artery enlargement. Recommend CXR correlation and follow up to exclude developing pneumonic process at the right base.\par \par CXR 4/7/23: No acute cardiopulmonary dz. \par \par EKG 4/7/23: SR at 84 bpm, RP interval 164 ms, QTc 453, LAD, PRWP, nonspecific ST-T wave abnormalities \par \par Labs 1/25/23: Trigs 125, HDL 42, LDL 39, Chol 106, TSH 2.33.\par \par Nuke 10/18/21: Lexiscan. Negative.\par

## 2023-06-08 ENCOUNTER — NON-APPOINTMENT (OUTPATIENT)
Age: 63
End: 2023-06-08

## 2023-07-10 ENCOUNTER — APPOINTMENT (OUTPATIENT)
Dept: CARDIOLOGY | Facility: CLINIC | Age: 63
End: 2023-07-10
Payer: MEDICARE

## 2023-07-10 VITALS
SYSTOLIC BLOOD PRESSURE: 118 MMHG | HEIGHT: 71 IN | HEART RATE: 104 BPM | WEIGHT: 210 LBS | BODY MASS INDEX: 29.4 KG/M2 | DIASTOLIC BLOOD PRESSURE: 74 MMHG | OXYGEN SATURATION: 97 %

## 2023-07-10 DIAGNOSIS — R42 DIZZINESS AND GIDDINESS: ICD-10-CM

## 2023-07-10 PROCEDURE — 99214 OFFICE O/P EST MOD 30 MIN: CPT

## 2023-07-10 RX ORDER — METOPROLOL SUCCINATE 25 MG/1
25 TABLET, EXTENDED RELEASE ORAL DAILY
Qty: 90 | Refills: 3 | Status: DISCONTINUED | COMMUNITY
Start: 2023-04-10 | End: 2023-07-10

## 2023-07-11 ENCOUNTER — NON-APPOINTMENT (OUTPATIENT)
Age: 63
End: 2023-07-11

## 2023-07-12 ENCOUNTER — NON-APPOINTMENT (OUTPATIENT)
Age: 63
End: 2023-07-12

## 2023-07-13 ENCOUNTER — NON-APPOINTMENT (OUTPATIENT)
Age: 63
End: 2023-07-13

## 2023-07-15 ENCOUNTER — NON-APPOINTMENT (OUTPATIENT)
Age: 63
End: 2023-07-15

## 2023-07-17 ENCOUNTER — NON-APPOINTMENT (OUTPATIENT)
Age: 63
End: 2023-07-17

## 2023-07-24 ENCOUNTER — NON-APPOINTMENT (OUTPATIENT)
Age: 63
End: 2023-07-24

## 2023-07-25 ENCOUNTER — NON-APPOINTMENT (OUTPATIENT)
Age: 63
End: 2023-07-25

## 2023-07-26 NOTE — HISTORY OF PRESENT ILLNESS
[FreeTextEntry1] : \par 63 year old male with a past medical history of HTN, HL, obstructive CAD complex PCI mRCA 10/27/22 laser, cutting balloon, brachytherapy, angina. COPD, CKD, Depression. GERD. Depression. Chronic back pain 2/2 spinal stenosis. Aortic aneurysm. BPH. prior lad/diag stents. \par \par 7/2023 VISIT: no further "heart burn." Trying to get further cardiac rehab approved.  Feels dizzy.  Mild tachycardia on beta-blocker.  on imdur 120. fatigue is improved after stopping beta blocker. +weight loss. Has mild improvement in his creatinine overall.  LDL uncontrolled 120. right hip flexor hernia from rowing. \par \par 6/2023 VISIT: interim multiple issues: fatigue, epigastric pain. randomly has heart burning twice in past 2 weeks lasting a few seconds. reports this started when on metoprolol. there is no exertional component. \par on praluent, off statin. \par is awaiting to restart cardiac rehab. \par \par 5/2023 VISIT:\par main concern is memory changes since 2019 and word finding difficulties. \par interim testing\par labwork: cbc, lft, cr 1.75. was dehydrated. \par nuclear no perfusion issue. 6 METS\par abd u/s no organ pathology\par carotid doppler no significant disease but some calcified structure anterior to r cca \par echo: preserved ef. normal diastology. mild vhd\par 7 day zio average 73 bpm, no sig rhythm disturbance \par \par 4/2023 VISIT: ER 4/2023 with low blood pressure. Cardiology was consulted for hypotension. The patient was at cardiac rehab when he finished his time on treadmill and VS were checked. They noted his HR was elevated and SBP 90's patient said they rechecked twice with no improvement. RRT called because patient was weak. Was given IVF and BP improved. Olmesartan dc'ed. Signed out AMA. Presents today for BP evaluation/HFU. Denies Chest pain. There is some intermittent SOB, occasional flutters, and dizziness and lightheadedness. Feels better off of the Olmesartan.\par \par 11/28/2022: underwent brachytherapy RCA. doing well. ekg unchanged. surveillance pending. compliant DAPT. \par \par 10/2022: CAD with recent unstable angina due to mid RCA ISR mechanism underexpansion with minimal luminal gain after high-pressure noncompliant ballooning and scoring balloon necessitating laser atherectomy. Expansion was limited by fibrotic tissue. \par He had laser/shockwave/cutting 10/27/22 with good result of mid RCA ISR. He was to be set up for brachytherapy within a month at [facility].\par \par \par TESTING I REVIEWED TODAY excluding above:\par 5/2023 \par labwork: cbc, lft, cr 1.75. was dehydrated. \par nuclear no perfusion issue. 6 METS\par abd u/s no organ pathology\par carotid doppler no significant disease but some calcified structure anterior to r cca \par echo: preserved ef. normal diastology. mild vhd\par 7 day zio average 73 bpm, no sig rhythm disturbance \par \par Labs 4/2023: Na 139, K 3.9, Cr 1.75, Ca 9.4, Mag 2, AST 92, ALT 53, Trops < 0.010 x 3, BNP < 5, WBC 7.78, Hgb 13.7, HCT 41.9, plt 265, \par \par CT chest/abd/pelvis w or without contrast 4/7/23: No acute pathology in the chest, abd, or pelvis. Other stable chronic findings with mild ectasia of the ascending thoracid aorta and main pulmonary artery enlargement. Recommend CXR correlation and follow up to exclude developing pneumonic process at the right base.\par \par CXR 4/7/23: No acute cardiopulmonary dz. \par \par EKG 4/7/23: SR at 84 bpm, RP interval 164 ms, QTc 453, LAD, PRWP, nonspecific ST-T wave abnormalities \par \par Labs 1/25/23: Trigs 125, HDL 42, LDL 39, Chol 106, TSH 2.33.\par \par [facility] 10/18/21: Lexiscan. Negative.\par

## 2023-08-14 ENCOUNTER — NON-APPOINTMENT (OUTPATIENT)
Age: 63
End: 2023-08-14

## 2023-08-14 ENCOUNTER — APPOINTMENT (OUTPATIENT)
Dept: CARDIOLOGY | Facility: CLINIC | Age: 63
End: 2023-08-14
Payer: MEDICARE

## 2023-08-14 VITALS
HEART RATE: 102 BPM | BODY MASS INDEX: 28.56 KG/M2 | OXYGEN SATURATION: 97 % | SYSTOLIC BLOOD PRESSURE: 106 MMHG | HEIGHT: 71 IN | WEIGHT: 204 LBS | DIASTOLIC BLOOD PRESSURE: 62 MMHG

## 2023-08-14 PROCEDURE — 99215 OFFICE O/P EST HI 40 MIN: CPT

## 2023-08-15 NOTE — DISCUSSION/SUMMARY
[FreeTextEntry1] :  # CAD mid RCA ISR now with shockwave/laser/cutting balloon and brachytherapy: - monitor for recurrence. intolerant to beta blocker due to fatigue. - increase praluent to 150 q2w.  - dapt long term as tolerated given multiple stents including LAD/Diag Stents and interventions as above. On ASA and Brilinta. On Isosorbide. - restart cardiac rehab if approved  - We discussed ozempic in office and there is no cardiac contraindication to this.  # Thoracic aorta dilation 4.3 cm.  - yearly CTA chest and AAA u/s next 5/2024   HTN: Intolerant to Olmesartan and beta blockers. nitrate. if BP uncontrolled, can do diltiazem low dose.   CKD: serial labs. was dehydrated.   hiatal hernia could be cause of his epigastric/central chest pain   Follow after cath. ER precautions given to patient.

## 2023-08-15 NOTE — ADDENDUM
[FreeTextEntry1] :  Addendum on 815/23:  # Pre operative cv evaluation for GI evaluation: coronary angiogram 8/15/23 without obstructive cad. METS>4. EF preserved. normal perfusion imaging recently. No recent CV hospitalizations. No recent decompensated heart failure, preclusive arrhythmias. - No further cardiac testing required prior to procedure. He will continue cardiovascular medications as tolerated lakhwinder-procedurally except he can hold brilinta 3 days prior and restart 24 hours after procedure after at discretion of proceduralist ideally decrease duration off this to as little as possible.

## 2023-08-28 ENCOUNTER — APPOINTMENT (OUTPATIENT)
Dept: CARDIOLOGY | Facility: CLINIC | Age: 63
End: 2023-08-28
Payer: MEDICARE

## 2023-08-28 VITALS
WEIGHT: 206 LBS | OXYGEN SATURATION: 96 % | SYSTOLIC BLOOD PRESSURE: 140 MMHG | BODY MASS INDEX: 28.84 KG/M2 | DIASTOLIC BLOOD PRESSURE: 88 MMHG | HEART RATE: 100 BPM | HEIGHT: 71 IN

## 2023-08-28 PROCEDURE — 99215 OFFICE O/P EST HI 40 MIN: CPT

## 2023-08-28 RX ORDER — TRAZODONE HYDROCHLORIDE 150 MG/1
150 TABLET ORAL
Qty: 90 | Refills: 0 | Status: ACTIVE | COMMUNITY
Start: 2023-07-13

## 2023-08-28 NOTE — HISTORY OF PRESENT ILLNESS
Requested Prescriptions     Name from pharmacy: Ozempic (0.25 or 0.5 MG/DOSE) 2 MG/1.5ML Solution pen-injector         Will file in chart as: Ozempic, 0.25 or 0.5 MG/DOSE, 2 MG/1.5ML injection    Sig: INJECT 0.5MG SUBCUTANEOUSLY EVERY 7 DAYS ON TUES *REFRIGERATE*    Disp:  1.5 mL    Refills:  11    Start: 1/24/2023    Class: Eprescribe    Non-formulary For: Type 2 diabetes mellitus with other specified complication, without long-term current use of insulin (CMS/[facility]); Morbid (severe) obesity due to excess calories (CMS/[facility])    To pharmacy: URGENT  SECOND REQUEST    Last ordered: 7 months ago by Jerry Castro MD Last refill: 11/30/2022    Rx #: [id number]        Chart reviewed  LOV: 6/9/22  RTC: 3 months  Next appt: 4/14/23  Labs: none    Refill sent to pharmacy per protocol.      [FreeTextEntry1] : 63 year old male with a past medical history of HTN, HL, obstructive CAD complex PCI mRCA 10/27/22 laser and brachytherapy, COPD, CKD, Depression. GERD. Depression. Chronic back pain 2/2 spinal stenosis. Aortic dilation. BPH. prior lad/diag stents.   8/28/23 VIIST: diagnostic coronary angiogram mild isr rca otherwise no significant disease. ifr showed no hemodynamic significance. increasing praluent.  he underwent his gi evaluation endoscopy etc. back on brilinta. no issues.    8/2023 VISIT: pending gi work up. 2 weeks ago had resting right chest "grabbing" resolved with NTG. no other recurrence.  cr 1.63   7/2023 VISIT: no further "heart burn." Trying to get further cardiac rehab approved.  Feels dizzy.  Mild tachycardia on beta-blocker.  on imdur 120. fatigue is improved after stopping beta blocker. +weight loss. Has mild improvement in his creatinine overall.  LDL uncontrolled 120. right hip flexor hernia from rowing.   6/2023 VISIT: interim multiple issues: fatigue, epigastric pain. randomly has heart burning twice in past 2 weeks lasting a few seconds. reports this started when on metoprolol. there is no exertional component.  on praluent, off statin.  is awaiting to restart cardiac rehab.   5/2023 VISIT: main concern is memory changes since 2019 and word finding difficulties.  interim testing labwork: cbc, lft, cr 1.75. was dehydrated.  nuclear no perfusion issue. 6 METS abd u/s no organ pathology carotid doppler no significant disease but some calcified structure anterior to r cca  echo: preserved ef. normal diastology. mild vhd 7 day zio average 73 bpm, no sig rhythm disturbance   4/2023 VISIT: ER 4/2023 with low blood pressure. Cardiology was consulted for hypotension. The patient was at cardiac rehab when he finished his time on treadmill and VS were checked. They noted his HR was elevated and SBP 90's patient said they rechecked twice with no improvement. RRT called because patient was weak. Was given IVF and BP improved. Olmesartan dc'ed. Signed out AMA. Presents today for BP evaluation/HFU. Denies Chest pain. There is some intermittent SOB, occasional flutters, and dizziness and lightheadedness. Feels better off of the Olmesartan.  11/28/2022: underwent brachytherapy RCA. doing well. ekg unchanged. surveillance pending. compliant DAPT.   10/2022: CAD with recent unstable angina due to mid RCA ISR mechanism underexpansion with minimal luminal gain after high-pressure noncompliant ballooning and scoring balloon necessitating laser atherectomy. Expansion was limited by fibrotic tissue.  He had laser/shockwave/cutting 10/27/22 with good result of mid RCA ISR. He was to be set up for brachytherapy within a month at [place].   TESTING I REVIEWED TODAY excluding above: 5/2023  labwork: cbc, lft, cr 1.75. was dehydrated.  nuclear no perfusion issue. 6 METS abd u/s no organ pathology carotid doppler no significant disease but some calcified structure anterior to r cca  echo: preserved ef. normal diastology. mild vhd 7 day zio average 73 bpm, no sig rhythm disturbance   Labs 4/2023: Na 139, K 3.9, Cr 1.75, Ca 9.4, Mag 2, AST 92, ALT 53, Trops < 0.010 x 3, BNP < 5, WBC 7.78, Hgb 13.7, HCT 41.9, plt 265,   CT chest/abd/pelvis w or without contrast 4/7/23: No acute pathology in the chest, abd, or pelvis. Other stable chronic findings with mild ectasia of the ascending thoracid aorta and main pulmonary artery enlargement. Recommend CXR correlation and follow up to exclude developing pneumonic process at the right base.  CXR 4/7/23: No acute cardiopulmonary dz.   EKG 4/7/23: SR at 84 bpm, RP interval 164 ms, QTc 453, LAD, PRWP, nonspecific ST-T wave abnormalities   Labs 1/25/23: Trigs 125, HDL 42, LDL 39, Chol 106, TSH 2.33.  [facility] 10/18/21: Lexiscan. Negative.

## 2023-08-28 NOTE — END OF VISIT
[Time Spent: ___ minutes] : I have spent [unfilled] minutes of time on the encounter.
B UE 4/5, R LE 3+/5, L LE 3/5./grossly assessed due to

## 2023-08-28 NOTE — DISCUSSION/SUMMARY
[FreeTextEntry1] : # CAD mid RCA ISR now with shockwave/laser/cutting balloon and brachytherapy: - monitor for recurrence. intolerant to beta blocker due to fatigue. - increased praluent to 150 q2w.  - dapt long term as tolerated given multiple stents including LAD/Diag Stents and interventions as above. On ASA and Brilinta. On Isosorbide. - restart cardiac rehab if approved  - We discussed ozempic in office and there is no cardiac contraindication to this.  # Chest pain: i reviewed his endoscopy reports appears to have active esophagitis. He is discussing with his provider on 9/7/23. There is also discussion that hiatal hernia could be cause of his symptoms. add diltiazem.   # Thoracic aorta dilation 4.3 cm.  - yearly CTA chest and AAA u/s next 5/2024   HTN: Intolerant to Olmesartan and beta blockers. nitrate. ADD diltiazem.   CKD: serial labs. was dehydrated. cr 1.3.   Follow in 3 months. ER precautions given to patient.

## 2023-10-02 ENCOUNTER — RESULT CHARGE (OUTPATIENT)
Age: 63
End: 2023-10-02

## 2023-10-03 ENCOUNTER — APPOINTMENT (OUTPATIENT)
Dept: CARDIOLOGY | Facility: CLINIC | Age: 63
End: 2023-10-03
Payer: MEDICARE

## 2023-10-03 ENCOUNTER — NON-APPOINTMENT (OUTPATIENT)
Age: 63
End: 2023-10-03

## 2023-10-03 VITALS
WEIGHT: 211 LBS | DIASTOLIC BLOOD PRESSURE: 82 MMHG | BODY MASS INDEX: 29.43 KG/M2 | SYSTOLIC BLOOD PRESSURE: 122 MMHG | HEART RATE: 102 BPM | OXYGEN SATURATION: 98 %

## 2023-10-03 DIAGNOSIS — I10 ESSENTIAL (PRIMARY) HYPERTENSION: ICD-10-CM

## 2023-10-03 DIAGNOSIS — T82.855A STENOSIS OF CORONARY ARTERY STENT, INITIAL ENCOUNTER: ICD-10-CM

## 2023-10-03 DIAGNOSIS — K46.9 UNSPECIFIED ABDOMINAL HERNIA W/OUT OBSTRUCTION OR GANGRENE: ICD-10-CM

## 2023-10-03 PROCEDURE — 99215 OFFICE O/P EST HI 40 MIN: CPT

## 2023-10-23 ENCOUNTER — APPOINTMENT (OUTPATIENT)
Dept: CARDIOLOGY | Facility: CLINIC | Age: 63
End: 2023-10-23

## 2023-11-13 ENCOUNTER — NON-APPOINTMENT (OUTPATIENT)
Age: 63
End: 2023-11-13

## 2023-11-14 ENCOUNTER — NON-APPOINTMENT (OUTPATIENT)
Age: 63
End: 2023-11-14

## 2023-11-15 ENCOUNTER — NON-APPOINTMENT (OUTPATIENT)
Age: 63
End: 2023-11-15

## 2023-11-27 ENCOUNTER — NON-APPOINTMENT (OUTPATIENT)
Age: 63
End: 2023-11-27

## 2023-11-29 NOTE — PHYSICAL EXAM
MEDICAL RECORDS REQUEST   [facility]  Urology Clinic  [address] [place] [address]  PHONE: [phone]  Fax: [phone]        FUTURE VISIT INFORMATION                                                   Carlitos Hernandez, : 1952 scheduled for future visit at [facility]    APPOINTMENT INFORMATION:  Date: 2023  Provider:  Ham Shay MD  Reason for Visit/Diagnosis: Hydrocele    REFERRAL INFORMATION:  Referring provider:  Ovidio Franco MD in [facility]      RECORDS REQUESTED FOR VISIT                                                     NOTES  STATUS/DETAILS   OFFICE NOTE from referring provider  yes, 2023 -- Ovidio Franco MD in [facility]   MEDICATION LIST  yes     PRE-VISIT CHECKLIST      Joint diagnostic appointment coordinated correctly          (ensure right order & amount of time) Yes   RECORD COLLECTION COMPLETE Yes      [Normal Gait] : normal gait [No Acute Distress] : no acute distress [Normal Affect] : the affect was normal [de-identified] : obese  [de-identified] : deep tender rt side mid axillary to psic

## 2023-12-07 RX ORDER — ALFUZOSIN HYDROCHLORIDE 10 MG/1
10 TABLET, EXTENDED RELEASE ORAL DAILY
Refills: 0 | Status: ACTIVE | COMMUNITY

## 2023-12-09 ENCOUNTER — NON-APPOINTMENT (OUTPATIENT)
Age: 63
End: 2023-12-09

## 2023-12-10 ENCOUNTER — RX RENEWAL (OUTPATIENT)
Age: 63
End: 2023-12-10

## 2023-12-11 ENCOUNTER — APPOINTMENT (OUTPATIENT)
Dept: CARDIOLOGY | Facility: CLINIC | Age: 63
End: 2023-12-11
Payer: MEDICARE

## 2023-12-11 VITALS
DIASTOLIC BLOOD PRESSURE: 64 MMHG | OXYGEN SATURATION: 98 % | HEIGHT: 71 IN | BODY MASS INDEX: 25.2 KG/M2 | HEART RATE: 95 BPM | WEIGHT: 180 LBS | SYSTOLIC BLOOD PRESSURE: 112 MMHG

## 2023-12-11 PROCEDURE — 99215 OFFICE O/P EST HI 40 MIN: CPT

## 2023-12-11 RX ORDER — FAMOTIDINE 40 MG/1
40 TABLET, FILM COATED ORAL
Qty: 90 | Refills: 0 | Status: DISCONTINUED | COMMUNITY
Start: 2022-06-09 | End: 2023-12-11

## 2024-01-04 ENCOUNTER — NON-APPOINTMENT (OUTPATIENT)
Age: 64
End: 2024-01-04

## 2024-01-10 ENCOUNTER — APPOINTMENT (OUTPATIENT)
Dept: CARDIOLOGY | Facility: CLINIC | Age: 64
End: 2024-01-10
Payer: MEDICARE

## 2024-01-10 ENCOUNTER — NON-APPOINTMENT (OUTPATIENT)
Age: 64
End: 2024-01-10

## 2024-01-10 VITALS
OXYGEN SATURATION: 95 % | BODY MASS INDEX: 26.92 KG/M2 | SYSTOLIC BLOOD PRESSURE: 100 MMHG | WEIGHT: 193 LBS | DIASTOLIC BLOOD PRESSURE: 66 MMHG | HEART RATE: 95 BPM

## 2024-01-10 DIAGNOSIS — K21.9 GASTRO-ESOPHAGEAL REFLUX DISEASE W/OUT ESOPHAGITIS: ICD-10-CM

## 2024-01-10 DIAGNOSIS — R07.89 OTHER CHEST PAIN: ICD-10-CM

## 2024-01-10 PROCEDURE — 99214 OFFICE O/P EST MOD 30 MIN: CPT

## 2024-01-10 PROCEDURE — 93000 ELECTROCARDIOGRAM COMPLETE: CPT

## 2024-01-10 RX ORDER — BUDESONIDE AND FORMOTEROL FUMARATE DIHYDRATE 160; 4.5 UG/1; UG/1
160-4.5 AEROSOL RESPIRATORY (INHALATION)
Refills: 0 | Status: DISCONTINUED | COMMUNITY
End: 2024-01-10

## 2024-01-10 RX ORDER — PANTOPRAZOLE 40 MG/1
40 TABLET, DELAYED RELEASE ORAL DAILY
Refills: 0 | Status: DISCONTINUED | COMMUNITY
End: 2024-01-10

## 2024-01-10 RX ORDER — TIOTROPIUM BROMIDE INHALATION SPRAY 3.12 UG/1
2.5 SPRAY, METERED RESPIRATORY (INHALATION)
Refills: 0 | Status: DISCONTINUED | COMMUNITY
End: 2024-01-10

## 2024-01-10 RX ORDER — DOXYCYCLINE 100 MG/1
100 CAPSULE ORAL
Qty: 28 | Refills: 0 | Status: DISCONTINUED | COMMUNITY
Start: 2023-08-16 | End: 2024-01-10

## 2024-01-16 PROBLEM — K21.9 GERD (GASTROESOPHAGEAL REFLUX DISEASE): Status: ACTIVE | Noted: 2019-11-11

## 2024-01-16 NOTE — REVIEW OF SYSTEMS
[Feeling Fatigued] : feeling fatigued [SOB] : no shortness of breath [Dyspnea on exertion] : not dyspnea during exertion [Chest Discomfort] : chest discomfort [Leg Claudication] : no intermittent leg claudication [Palpitations] : palpitations [Syncope] : no syncope [Cough] : no cough [Wheezing] : no wheezing [Coughing Up Blood] : no hemoptysis [Anxiety] : anxiety [Negative] : Neurological [FreeTextEntry5] : Right upper chest pain

## 2024-01-16 NOTE — HISTORY OF PRESENT ILLNESS
[FreeTextEntry1] : Patient is a 63-year-old male who presents today with recurrent chest pain.  He is recently recovering from hiatal hernia surgery and he initially felt better after the procedure.  He underwent extensive cardiac evaluation with a cardiac cath in August 2023 that revealed mild in-stent restenosis of his right coronary artery without flow limitations fractional flow reserve of 2.96.  He had other minor nonflow limiting lesions and presents today as he did on December 11 with Dr. Chan complaining of recurrent symptoms.  Patient has an unpredictable response to nitroglycerin some occasions it works some but does not.  His symptoms are not predictable and reproducible with exertion and he presents today concerned over his recurrent symptoms.    Patient is not on aggressive antireflux regimen and he presents today without any acute EKG changes whatsoever.  He currently is without active symptomatology and has been careful and conscientious about maintaining his medical regimen.  He notes specifically that when he did in fact have his angina it was left-sided and fairly typical in his description.  This discomfort is not like that and localized to the right side.

## 2024-01-16 NOTE — CARDIOLOGY SUMMARY
[de-identified] : NSR 90 bpm frontal QRS axis -45 degrees possible inferior wall MI indeterminate age.  AMI indeterminate age. [de-identified] : (4/21/2023)  ECHOCARDIOGRAPHIC CONCLUSIONS:  1. The left ventricular systolic function is normal with an ejection fraction visually estimated at 60 to 65%. 2. There is normal left ventricular diastolic function. 3. Mild left ventricular hypertrophy. 4. Mild mitral regurgitation. 5. Mild tricuspid regurgitation. 6. Compared to the transthoracic echocardiogram performed on 9/2/2021 normal diastolic function noted.

## 2024-01-16 NOTE — PHYSICAL EXAM
[Well Developed] : well developed [Well Nourished] : well nourished [No Acute Distress] : no acute distress [Normal S1, S2] : normal S1, S2 [No Murmur] : no murmur [No Rub] : no rub [No Gallop] : no gallop [Clear Lung Fields] : clear lung fields [Good Air Entry] : good air entry [No Respiratory Distress] : no respiratory distress  [Soft] : abdomen soft [No Masses/organomegaly] : no masses/organomegaly [Non Tender] : non-tender [Normal] : no edema, no cyanosis, no clubbing, no varicosities [Normal Radial B/L] : normal radial B/L [Normal PT B/L] : normal PT B/L [Normal DP B/L] : normal DP B/L [No Focal Deficits] : no focal deficits [de-identified] : No epigastric tenderness is

## 2024-01-16 NOTE — DISCUSSION/SUMMARY
[EKG obtained to assist in diagnosis and management of assessed problem(s)] : EKG obtained to assist in diagnosis and management of assessed problem(s) [FreeTextEntry1] : Discussion/Summary     63 year old male with recurrent chest pain, status post recent hiatal hernia surgery.  He has now been seen multiple times for chest pain not consistently was the type of anginal symptoms he had that was related to his prior coronary artery disease.  His symptoms are not qualitatively the same.  They are localized to the right side and do not predictably reproducibly occur with exertion.  They are not predictably relieved with nitro either.  Patient has a significant prior cardiac history notable for obstructive CAD complex PCI mRCA 10/27/22 laser and brachytherapy, COPD, CKD, Depression. GERD. Chronic back pain 2/2 spinal stenosis. Aortic dilation. BPH. prior lad/diag stents.  Patient is recently status post cardiac cath in August that failed to reveal any flow-limiting lesions, the only vessel with a visually borderline significant lesion had a fractional flow reserve 0.96.  Patient continues to be on antianginal regimen consisting of Cardizem which is known to exacerbate reflux.  At this point in time his antireflux regimen was intensified to 30 twice daily of Prevacid.  He is recommended to avoid caffeine, chocolate, alcohol and black licorice and his Cardizem was discontinued and changed to low-dose amlodipine to prevent spasm superimposed on a noncritical lesion if that is potentially acontributing factor covering both bases that of reflux and or coronary spasm.  Patient was recommended to follow-up in the next several weeks and to call within 2 weeks to apprise us of his response to the antireflux regimen.  Patient was reassured, he understood my rationale that his symptomatology was not consistent with prior ischemic symptoms.  Joel Goldberg, MD, FACC.  .    -  .

## 2024-01-16 NOTE — REASON FOR VISIT
[CV Risk Factors and Non-Cardiac Disease] : CV risk factors and non-cardiac disease [Hyperlipidemia] : hyperlipidemia [Coronary Artery Disease] : coronary artery disease [Hypertension] : hypertension [Other: ____] : [unfilled] [Spouse] : spouse [FreeTextEntry3] : Dr. Araceli Cordero, [FreeTextEntry1] : Patient is a 63-year-old male with documented CAD who is recently status post hiatal hernia surgery and presents to the office today with recurrent chest pain not  predictably relieved by nitroglycerin.  Patient had seen Dr. Chan on December 11 his suspicion based on recent cardiac cath did not warrant any change in therapy.  He presents today concerned over his recurrent symptoms for further evaluation and recommendations

## 2024-02-10 PROBLEM — M54.2 NECK PAIN: Status: ACTIVE | Noted: 2019-07-11

## 2024-02-10 PROBLEM — R06.02 EXERTIONAL SHORTNESS OF BREATH: Status: ACTIVE | Noted: 2018-01-17

## 2024-02-10 PROBLEM — E07.9 THYROID DISEASE: Status: ACTIVE | Noted: 2019-11-11

## 2024-02-10 NOTE — PHYSICAL EXAM
[Well Developed] : well developed [Well Nourished] : well nourished [No Acute Distress] : no acute distress [Normal Conjunctiva] : normal conjunctiva [Normal Venous Pressure] : normal venous pressure [No Carotid Bruit] : no carotid bruit [Normal S1, S2] : normal S1, S2 [No Murmur] : no murmur [No Rub] : no rub [Clear Lung Fields] : clear lung fields [No Gallop] : no gallop [Good Air Entry] : good air entry [No Respiratory Distress] : no respiratory distress  [Soft] : abdomen soft [Non Tender] : non-tender [No Masses/organomegaly] : no masses/organomegaly [Normal Bowel Sounds] : normal bowel sounds [Normal Gait] : normal gait [No Edema] : no edema [No Clubbing] : no clubbing [No Cyanosis] : no cyanosis [No Rash] : no rash [No Skin Lesions] : no skin lesions [No Varicosities] : no varicosities [Moves all extremities] : moves all extremities [No Focal Deficits] : no focal deficits [Alert and Oriented] : alert and oriented [Normal Speech] : normal speech [Normal memory] : normal memory

## 2024-02-12 ENCOUNTER — APPOINTMENT (OUTPATIENT)
Dept: CARDIOLOGY | Facility: CLINIC | Age: 64
End: 2024-02-12
Payer: MEDICARE

## 2024-02-12 VITALS
WEIGHT: 189 LBS | SYSTOLIC BLOOD PRESSURE: 108 MMHG | BODY MASS INDEX: 26.46 KG/M2 | HEART RATE: 105 BPM | DIASTOLIC BLOOD PRESSURE: 70 MMHG | OXYGEN SATURATION: 97 % | HEIGHT: 71 IN

## 2024-02-12 DIAGNOSIS — E07.9 DISORDER OF THYROID, UNSPECIFIED: ICD-10-CM

## 2024-02-12 DIAGNOSIS — R06.02 SHORTNESS OF BREATH: ICD-10-CM

## 2024-02-12 DIAGNOSIS — M54.2 CERVICALGIA: ICD-10-CM

## 2024-02-12 PROCEDURE — 99214 OFFICE O/P EST MOD 30 MIN: CPT

## 2024-02-12 RX ORDER — AMLODIPINE BESYLATE 5 MG/1
5 TABLET ORAL
Qty: 60 | Refills: 1 | Status: DISCONTINUED | COMMUNITY
Start: 2024-01-10 | End: 2024-02-12

## 2024-02-12 RX ORDER — AMLODIPINE BESYLATE 5 MG/1
5 TABLET ORAL DAILY
Qty: 60 | Refills: 1 | Status: DISCONTINUED | COMMUNITY
Start: 2024-01-10 | End: 2024-02-12

## 2024-02-12 RX ORDER — PANTOPRAZOLE 40 MG/1
40 TABLET, DELAYED RELEASE ORAL TWICE DAILY
Qty: 60 | Refills: 1 | Status: DISCONTINUED | COMMUNITY
Start: 2024-01-10 | End: 2024-02-12

## 2024-02-12 NOTE — REVIEW OF SYSTEMS
[Dyspnea on exertion] : dyspnea during exertion [Palpitations] : palpitations [Negative] : Psychiatric [FreeTextEntry5] : dizziness/lightheadedness

## 2024-02-12 NOTE — DISCUSSION/SUMMARY
[FreeTextEntry1] : 63 year old male with a past medical history of obstructive CAD complex PCI mRCA 10/27/22 laser and brachytherapy, COPD, CKD, Depression. GERD. Chronic back pain 2/2 spinal stenosis. Aortic dilation. BPH. prior lad/diag stents.   Chest pain resolved after hiatal hernia repair.   # CAD with complex multivessel PCI last 10/2022: no unstable signs/symptoms. breathing better after GI surgery.  - anti anginal therapy: CCB and nitrate; he is intolerant to beta blocker due to fatigue. - praluent 150 q2w and recheck labs  - dapt long term as tolerated given multiple stents, can consider aspirin/plavix in future.   # Thoracic aorta dilation 4.3 cm.  - yearly echo and AAA u/s next 5/2024   CKD: serial labs.  Follow in 3 months. ER precautions given to patient.

## 2024-02-12 NOTE — HISTORY OF PRESENT ILLNESS
[FreeTextEntry1] : 63 year old male with a past medical history of obstructive CAD complex PCI mRCA 10/27/22 laser and brachytherapy, COPD, CKD, Depression. GERD. Chronic back pain 2/2 spinal stenosis. Aortic dilation. BPH. prior lad/diag stents.   2/2024 VISIT: had an add on visit for chest pain. this has resolved since. LDL 85.   12/2023 VISIT: interim underwent surgery uncomplicated. lost weight. corrected hiatal hernia. reports when laying down having tightness right chest better with SL NTG. breathing much improved.   8/28/23 VIIST: diagnostic coronary angiogram mild isr rca otherwise no significant disease. ifr showed no hemodynamic significance. increasing praluent.  he underwent his gi evaluation endoscopy etc. back on brilinta. no issues.    8/2023 VISIT: pending gi work up. 2 weeks ago had resting right chest "grabbing" resolved with NTG. no other recurrence.  cr 1.63   7/2023 VISIT: no further "heart burn." Trying to get further cardiac rehab approved.  Feels dizzy.  Mild tachycardia on beta-blocker.  on imdur 120. fatigue is improved after stopping beta blocker. +weight loss. Has mild improvement in his creatinine overall.  LDL uncontrolled 120. right hip flexor hernia from rowing.   6/2023 VISIT: interim multiple issues: fatigue, epigastric pain. randomly has heart burning twice in past 2 weeks lasting a few seconds. reports this started when on metoprolol. there is no exertional component.  on praluent, off statin.  is awaiting to restart cardiac rehab.   5/2023 VISIT: main concern is memory changes since 2019 and word finding difficulties.  interim testing labwork: cbc, lft, cr 1.75. was dehydrated.  nuclear no perfusion issue. 6 METS abd u/s no organ pathology carotid doppler no significant disease but some calcified structure anterior to r cca  echo: preserved ef. normal diastology. mild vhd 7 day zio average 73 bpm, no sig rhythm disturbance   4/2023 VISIT: ER 4/2023 with low blood pressure. Cardiology was consulted for hypotension. The patient was at cardiac rehab when he finished his time on treadmill and VS were checked. They noted his HR was elevated and SBP 90's patient said they rechecked twice with no improvement. RRT called because patient was weak. Was given IVF and BP improved. Olmesartan dc'ed. Signed out AMA. Presents today for BP evaluation/HFU. Denies Chest pain. There is some intermittent SOB, occasional flutters, and dizziness and lightheadedness. Feels better off of the Olmesartan.  11/28/2022: underwent brachytherapy RCA. doing well. ekg unchanged. surveillance pending. compliant DAPT.   10/2022: CAD with recent unstable angina due to mid RCA ISR mechanism underexpansion with minimal luminal gain after high-pressure noncompliant ballooning and scoring balloon necessitating laser atherectomy. Expansion was limited by fibrotic tissue.  He had laser/shockwave/cutting 10/27/22 with good result of mid RCA ISR. He was to be set up for brachytherapy within a month at [facility].   TESTING I REVIEWED TODAY excluding above: 5/2023  labwork: cbc, lft, cr 1.75. was dehydrated.  nuclear no perfusion issue. 6 METS abd u/s no organ pathology carotid doppler no significant disease but some calcified structure anterior to r cca  echo: preserved ef. normal diastology. mild vhd 7 day zio average 73 bpm, no sig rhythm disturbance   Labs 4/2023: Na 139, K 3.9, Cr 1.75, Ca 9.4, Mag 2, AST 92, ALT 53, Trops < 0.010 x 3, BNP < 5, WBC 7.78, Hgb 13.7, HCT 41.9, plt 265,   CT chest/abd/pelvis w or without contrast 4/7/23: No acute pathology in the chest, abd, or pelvis. Other stable chronic findings with mild ectasia of the ascending thoracid aorta and main pulmonary artery enlargement. Recommend CXR correlation and follow up to exclude developing pneumonic process at the right base.  CXR 4/7/23: No acute cardiopulmonary dz.   EKG 4/7/23: SR at 84 bpm, RP interval 164 ms, QTc 453, LAD, PRWP, nonspecific ST-T wave abnormalities   Labs 1/25/23: Trigs 125, HDL 42, LDL 39, Chol 106, TSH 2.33.  [facility] 10/18/21: Lexiscan. Negative.

## 2024-02-12 NOTE — REASON FOR VISIT
[Symptom and Test Evaluation] : symptom and test evaluation [CV Risk Factors and Non-Cardiac Disease] : CV risk factors and non-cardiac disease [Coronary Artery Disease] : coronary artery disease [FreeTextEntry3] : Dr. Emily Murillo

## 2024-02-27 ENCOUNTER — OFFICE (OUTPATIENT)
Dept: URBAN - METROPOLITAN AREA CLINIC 97 | Facility: CLINIC | Age: 64
Setting detail: OPHTHALMOLOGY
End: 2024-02-27
Payer: MEDICARE

## 2024-02-27 DIAGNOSIS — H40.013: ICD-10-CM

## 2024-02-27 DIAGNOSIS — H35.40: ICD-10-CM

## 2024-02-27 DIAGNOSIS — H25.13: ICD-10-CM

## 2024-02-27 DIAGNOSIS — H43.813: ICD-10-CM

## 2024-02-27 PROCEDURE — 92133 CPTRZD OPH DX IMG PST SGM ON: CPT | Performed by: OPHTHALMOLOGY

## 2024-02-27 PROCEDURE — 99204 OFFICE O/P NEW MOD 45 MIN: CPT | Performed by: OPHTHALMOLOGY

## 2024-02-27 ASSESSMENT — CONFRONTATIONAL VISUAL FIELD TEST (CVF)
OD_FINDINGS: FULL
OS_FINDINGS: FULL

## 2024-02-28 PROBLEM — H43.813 POSTERIOR VITREOUS DETACHMENT; BOTH EYES: Status: ACTIVE | Noted: 2024-02-27

## 2024-02-28 PROBLEM — H35.40 PERIPHERAL RETINAL DEGENERATION UNSPECIFIED ; BOTH EYES: Status: ACTIVE | Noted: 2024-02-27

## 2024-02-28 ASSESSMENT — REFRACTION_MANIFEST
OS_VA2: 20/40(J3)
OD_CYLINDER: +1.75
OS_ADD: +3.00
OD_SPHERE: -2.50
OS_CYLINDER: +1.00
OD_SPHERE: -2.50
OS_AXIS: 150
OD_VA1: 20/50-2
OU_VA: 20/50-2
OU_VA: 20/50-2
OD_ADD: +3.00
OS_ADD: +3.00
OS_SPHERE: -2.50
OD_VA1: 20/50-2
OS_CYLINDER: +1.00
OD_CYLINDER: +1.75
OD_VA2: 20/20(J1+)
OD_AXIS: 010
OD_AXIS: 010
OS_VA2: 20/40(J3)
OS_AXIS: 150
OD_VA2: 20/20(J1+)
OS_SPHERE: -2.50
OD_ADD: +3.00
OS_VA1: 20/60+2
OS_VA1: 20/60+2

## 2024-02-28 ASSESSMENT — REFRACTION_CURRENTRX
OS_OVR_VA: 20/
OD_VPRISM_DIRECTION: SV
OD_AXIS: 023
OD_CYLINDER: +1.25
OD_OVR_VA: 20/
OS_VPRISM_DIRECTION: SV
OS_AXIS: 121
OS_SPHERE: -1.00
OD_SPHERE: -1.25
OS_CYLINDER: +1.00

## 2024-02-28 ASSESSMENT — SPHEQUIV_DERIVED
OS_SPHEQUIV: -6
OD_SPHEQUIV: -1.625
OD_SPHEQUIV: -1.625
OD_SPHEQUIV: -2.125
OS_SPHEQUIV: -2
OS_SPHEQUIV: -2

## 2024-02-28 ASSESSMENT — REFRACTION_AUTOREFRACTION
OD_AXIS: 012
OS_CYLINDER: +1.00
OS_AXIS: 148
OD_SPHERE: -3.25
OD_CYLINDER: +2.25
OS_SPHERE: -6.50

## 2024-03-11 ENCOUNTER — APPOINTMENT (OUTPATIENT)
Dept: CARDIOLOGY | Facility: CLINIC | Age: 64
End: 2024-03-11

## 2024-03-12 PROBLEM — Z01.810 PREOPERATIVE CARDIOVASCULAR EXAMINATION: Status: ACTIVE | Noted: 2019-08-06

## 2024-03-12 NOTE — PHYSICAL EXAM
[Well Developed] : well developed [Well Nourished] : well nourished [No Acute Distress] : no acute distress [Normal Conjunctiva] : normal conjunctiva [No Carotid Bruit] : no carotid bruit [Normal Venous Pressure] : normal venous pressure [No Murmur] : no murmur [Normal S1, S2] : normal S1, S2 [No Rub] : no rub [No Gallop] : no gallop [Clear Lung Fields] : clear lung fields [Good Air Entry] : good air entry [No Respiratory Distress] : no respiratory distress  [Soft] : abdomen soft [No Masses/organomegaly] : no masses/organomegaly [Non Tender] : non-tender [Normal Bowel Sounds] : normal bowel sounds [Normal Gait] : normal gait [No Edema] : no edema [No Cyanosis] : no cyanosis [No Clubbing] : no clubbing [No Rash] : no rash [No Varicosities] : no varicosities [No Skin Lesions] : no skin lesions [Moves all extremities] : moves all extremities [Normal Speech] : normal speech [No Focal Deficits] : no focal deficits [Normal memory] : normal memory [Alert and Oriented] : alert and oriented

## 2024-03-13 ENCOUNTER — NON-APPOINTMENT (OUTPATIENT)
Age: 64
End: 2024-03-13

## 2024-03-13 ENCOUNTER — APPOINTMENT (OUTPATIENT)
Dept: CARDIOLOGY | Facility: CLINIC | Age: 64
End: 2024-03-13
Payer: MEDICARE

## 2024-03-13 VITALS
OXYGEN SATURATION: 95 % | SYSTOLIC BLOOD PRESSURE: 106 MMHG | HEIGHT: 70.5 IN | DIASTOLIC BLOOD PRESSURE: 80 MMHG | BODY MASS INDEX: 28.03 KG/M2 | HEART RATE: 105 BPM | WEIGHT: 198 LBS

## 2024-03-13 DIAGNOSIS — Z01.810 ENCOUNTER FOR PREPROCEDURAL CARDIOVASCULAR EXAMINATION: ICD-10-CM

## 2024-03-13 PROCEDURE — 93000 ELECTROCARDIOGRAM COMPLETE: CPT

## 2024-03-13 PROCEDURE — 99214 OFFICE O/P EST MOD 30 MIN: CPT

## 2024-03-13 RX ORDER — THIAMINE HCL 100 MG
500 TABLET ORAL
Refills: 0 | Status: ACTIVE | COMMUNITY

## 2024-03-13 RX ORDER — MULTIVITAMIN
TABLET ORAL DAILY
Refills: 0 | Status: ACTIVE | COMMUNITY

## 2024-03-13 RX ORDER — CHROMIUM 200 MCG
TABLET ORAL DAILY
Refills: 0 | Status: ACTIVE | COMMUNITY

## 2024-03-13 NOTE — REVIEW OF SYSTEMS
[Palpitations] : palpitations [Dyspnea on exertion] : dyspnea during exertion [Negative] : Psychiatric [FreeTextEntry5] : dizziness/lightheadedness

## 2024-03-13 NOTE — HISTORY OF PRESENT ILLNESS
[FreeTextEntry1] : 63 year old male with a past medical history of obstructive CAD complex PCI mRCA 10/27/22 laser and brachytherapy, COPD, CKD, Depression. GERD. Chronic back pain 2/2 spinal stenosis. Aortic dilation. BPH. prior lad/diag stents.   3/2024 VISIT: no angina. ekg unchanged. bp better. +weight gain unfortunately.   2/2024 VISIT: had an add on visit for chest pain. this has resolved since. LDL 85.   12/2023 VISIT: interim underwent surgery uncomplicated. lost weight. corrected hiatal hernia. reports when laying down having tightness right chest better with SL NTG. breathing much improved.   8/28/23 VIIST: diagnostic coronary angiogram mild isr rca otherwise no significant disease. ifr showed no hemodynamic significance. increasing praluent.  he underwent his gi evaluation endoscopy etc. back on brilinta. no issues.    8/2023 VISIT: pending gi work up. 2 weeks ago had resting right chest "grabbing" resolved with NTG. no other recurrence.  cr 1.63   7/2023 VISIT: no further "heart burn." Trying to get further cardiac rehab approved.  Feels dizzy.  Mild tachycardia on beta-blocker.  on imdur 120. fatigue is improved after stopping beta blocker. +weight loss. Has mild improvement in his creatinine overall.  LDL uncontrolled 120. right hip flexor hernia from rowing.   6/2023 VISIT: interim multiple issues: fatigue, epigastric pain. randomly has heart burning twice in past 2 weeks lasting a few seconds. reports this started when on metoprolol. there is no exertional component.  on praluent, off statin.  is awaiting to restart cardiac rehab.   5/2023 VISIT: main concern is memory changes since 2019 and word finding difficulties.  interim testing labwork: cbc, lft, cr 1.75. was dehydrated.  nuclear no perfusion issue. 6 METS abd u/s no organ pathology carotid doppler no significant disease but some calcified structure anterior to r cca  echo: preserved ef. normal diastology. mild vhd 7 day zio average 73 bpm, no sig rhythm disturbance   4/2023 VISIT: ER 4/2023 with low blood pressure. Cardiology was consulted for hypotension. The patient was at cardiac rehab when he finished his time on treadmill and VS were checked. They noted his HR was elevated and SBP 90's patient said they rechecked twice with no improvement. RRT called because patient was weak. Was given IVF and BP improved. Olmesartan dc'ed. Signed out AMA. Presents today for BP evaluation/HFU. Denies Chest pain. There is some intermittent SOB, occasional flutters, and dizziness and lightheadedness. Feels better off of the Olmesartan.  11/28/2022: underwent brachytherapy RCA. doing well. ekg unchanged. surveillance pending. compliant DAPT.   10/2022: CAD with recent unstable angina due to mid RCA ISR mechanism underexpansion with minimal luminal gain after high-pressure noncompliant ballooning and scoring balloon necessitating laser atherectomy. Expansion was limited by fibrotic tissue.  He had laser/shockwave/cutting 10/27/22 with good result of mid RCA ISR. He was to be set up for brachytherapy within a month at [place].   TESTING I REVIEWED TODAY excluding above: 5/2023  labwork: cbc, lft, cr 1.75. was dehydrated.  nuclear no perfusion issue. 6 METS abd u/s no organ pathology carotid doppler no significant disease but some calcified structure anterior to r cca  echo: preserved ef. normal diastology. mild vhd 7 day zio average 73 bpm, no sig rhythm disturbance   Labs 4/2023: Na 139, K 3.9, Cr 1.75, Ca 9.4, Mag 2, AST 92, ALT 53, Trops < 0.010 x 3, BNP < 5, WBC 7.78, Hgb 13.7, HCT 41.9, plt 265,   CT chest/abd/pelvis w or without contrast 4/7/23: No acute pathology in the chest, abd, or pelvis. Other stable chronic findings with mild ectasia of the ascending thoracid aorta and main pulmonary artery enlargement. Recommend CXR correlation and follow up to exclude developing pneumonic process at the right base.  CXR 4/7/23: No acute cardiopulmonary dz.   EKG 4/7/23: SR at 84 bpm, RP interval 164 ms, QTc 453, LAD, PRWP, nonspecific ST-T wave abnormalities   Labs 1/25/23: Trigs 125, HDL 42, LDL 39, Chol 106, TSH 2.33.  [facility] 10/18/21: Lexiscan. Negative.

## 2024-03-18 ENCOUNTER — NON-APPOINTMENT (OUTPATIENT)
Age: 64
End: 2024-03-18

## 2024-03-18 ENCOUNTER — OFFICE (OUTPATIENT)
Dept: URBAN - METROPOLITAN AREA CLINIC 8 | Facility: CLINIC | Age: 64
Setting detail: OPHTHALMOLOGY
End: 2024-03-18
Payer: MEDICARE

## 2024-03-18 DIAGNOSIS — H25.13: ICD-10-CM

## 2024-03-18 DIAGNOSIS — H25.12: ICD-10-CM

## 2024-03-18 PROBLEM — H43.813 POSTERIOR VITREOUS DETACHMENT; BOTH EYES: Status: ACTIVE | Noted: 2024-03-18

## 2024-03-18 PROCEDURE — 92136 OPHTHALMIC BIOMETRY: CPT | Mod: TC | Performed by: OPHTHALMOLOGY

## 2024-03-18 PROCEDURE — 99214 OFFICE O/P EST MOD 30 MIN: CPT | Performed by: OPHTHALMOLOGY

## 2024-03-18 PROCEDURE — 92136 OPHTHALMIC BIOMETRY: CPT | Mod: 26,LT | Performed by: OPHTHALMOLOGY

## 2024-03-18 ASSESSMENT — REFRACTION_MANIFEST
OD_VA2: 20/20(J1+)
OS_AXIS: 060
OD_ADD: +3.00
OD_VA1: 20/50-2
OD_SPHERE: -2.50
OD_AXIS: 100
OD_VA2: 20/20(J1+)
OS_ADD: +3.00
OD_CYLINDER: +1.75
OD_ADD: +3.00
OD_AXIS: 010
OU_VA: 20/50-2
OD_SPHERE: -0.75
OS_SPHERE: -2.50
OS_CYLINDER: -1.00
OS_ADD: +3.00
OU_VA: 20/50-2
OS_AXIS: 150
OS_CYLINDER: +1.00
OD_VA1: 20/50-2
OS_VA1: 20/60+2
OS_VA2: 20/40(J3)
OS_VA1: 20/60+2
OS_SPHERE: -1.50
OD_CYLINDER: -1.75
OS_VA2: 20/40(J3)

## 2024-03-18 ASSESSMENT — REFRACTION_CURRENTRX
OS_OVR_VA: 20/
OD_VPRISM_DIRECTION: SV
OS_AXIS: 121
OD_AXIS: 023
OD_SPHERE: -1.25
OS_VPRISM_DIRECTION: SV
OS_SPHERE: -1.00
OD_OVR_VA: 20/
OS_CYLINDER: +1.00
OD_CYLINDER: +1.25

## 2024-03-18 ASSESSMENT — SPHEQUIV_DERIVED
OD_SPHEQUIV: -1.625
OD_SPHEQUIV: -1.625
OS_SPHEQUIV: -2
OS_SPHEQUIV: -2

## 2024-03-19 RX ORDER — TICAGRELOR 90 MG/1
90 TABLET ORAL TWICE DAILY
Qty: 180 | Refills: 3 | Status: ACTIVE | COMMUNITY
Start: 1900-01-01 | End: 1900-01-01

## 2024-03-19 RX ORDER — DILTIAZEM HYDROCHLORIDE 60 MG/1
60 TABLET ORAL
Qty: 180 | Refills: 3 | Status: ACTIVE | COMMUNITY
Start: 2023-09-12 | End: 1900-01-01

## 2024-03-19 RX ORDER — DILTIAZEM HYDROCHLORIDE 120 MG/1
120 CAPSULE, EXTENDED RELEASE ORAL
Qty: 90 | Refills: 2 | Status: DISCONTINUED | COMMUNITY
Start: 2024-02-12 | End: 2024-03-19

## 2024-03-19 RX ORDER — ALIROCUMAB 150 MG/ML
150 INJECTION, SOLUTION SUBCUTANEOUS
Qty: 3 | Refills: 3 | Status: ACTIVE | COMMUNITY
Start: 2023-05-08 | End: 1900-01-01

## 2024-03-19 RX ORDER — ISOSORBIDE MONONITRATE 120 MG/1
120 TABLET, EXTENDED RELEASE ORAL DAILY
Qty: 90 | Refills: 1 | Status: ACTIVE | COMMUNITY
Start: 2017-11-17 | End: 1900-01-01

## 2024-04-03 ENCOUNTER — AMBULATORY SURGERY CENTER (OUTPATIENT)
Dept: URBAN - METROPOLITAN AREA SURGERY 4 | Facility: SURGERY | Age: 64
Setting detail: OPHTHALMOLOGY
End: 2024-04-03
Payer: MEDICARE

## 2024-04-03 DIAGNOSIS — H25.12: ICD-10-CM

## 2024-04-03 PROCEDURE — 66984 XCAPSL CTRC RMVL W/O ECP: CPT | Mod: LT | Performed by: OPHTHALMOLOGY

## 2024-04-04 ENCOUNTER — RX ONLY (RX ONLY)
Age: 64
End: 2024-04-04

## 2024-04-04 ENCOUNTER — OFFICE (OUTPATIENT)
Dept: URBAN - METROPOLITAN AREA CLINIC 97 | Facility: CLINIC | Age: 64
Setting detail: OPHTHALMOLOGY
End: 2024-04-04
Payer: MEDICARE

## 2024-04-04 DIAGNOSIS — H25.12: ICD-10-CM

## 2024-04-04 PROCEDURE — 99024 POSTOP FOLLOW-UP VISIT: CPT | Performed by: OPHTHALMOLOGY

## 2024-04-11 ENCOUNTER — OFFICE (OUTPATIENT)
Dept: URBAN - METROPOLITAN AREA CLINIC 97 | Facility: CLINIC | Age: 64
Setting detail: OPHTHALMOLOGY
End: 2024-04-11
Payer: MEDICARE

## 2024-04-11 DIAGNOSIS — Z96.1: ICD-10-CM

## 2024-04-11 PROBLEM — H43.813 POSTERIOR VITREOUS DETACHMENT; BOTH EYES: Status: ACTIVE | Noted: 2024-04-11

## 2024-04-11 PROCEDURE — 99024 POSTOP FOLLOW-UP VISIT: CPT | Performed by: OPHTHALMOLOGY

## 2024-05-01 ENCOUNTER — NON-APPOINTMENT (OUTPATIENT)
Age: 64
End: 2024-05-01

## 2024-05-07 ENCOUNTER — NON-APPOINTMENT (OUTPATIENT)
Age: 64
End: 2024-05-07

## 2024-05-08 ENCOUNTER — APPOINTMENT (OUTPATIENT)
Dept: CARDIOLOGY | Facility: CLINIC | Age: 64
End: 2024-05-08
Payer: MEDICARE

## 2024-05-08 PROCEDURE — 93978 VASCULAR STUDY: CPT

## 2024-05-08 PROCEDURE — 93306 TTE W/DOPPLER COMPLETE: CPT

## 2024-05-09 NOTE — REASON FOR VISIT
[Follow-Up - From Hospitalization] : follow-up of a recent hospitalization for
CBC/CMP/PT/PTT/INR/Type and Screen/EKG

## 2024-05-11 ENCOUNTER — RX RENEWAL (OUTPATIENT)
Age: 64
End: 2024-05-11

## 2024-05-11 RX ORDER — NITROGLYCERIN 0.4 MG/1
0.4 TABLET SUBLINGUAL
Qty: 90 | Refills: 0 | Status: ACTIVE | COMMUNITY
Start: 2017-12-01 | End: 1900-01-01

## 2024-05-12 PROBLEM — T82.855A CORONARY STENT RESTENOSIS DUE TO PROGRESSION OF DISEASE: Status: ACTIVE | Noted: 2022-01-17

## 2024-05-12 PROBLEM — Z87.891 FORMER SMOKER: Status: ACTIVE | Noted: 2018-01-26

## 2024-05-12 PROBLEM — I73.9 PVD (PERIPHERAL VASCULAR DISEASE): Status: ACTIVE | Noted: 2021-12-23

## 2024-05-12 PROBLEM — E78.5 HYPERLIPIDEMIA: Status: ACTIVE | Noted: 2017-11-09

## 2024-05-12 PROBLEM — I10 HYPERTENSION: Status: ACTIVE | Noted: 2017-11-17

## 2024-05-12 PROBLEM — Z79.02 ANTIPLATELET OR ANTITHROMBOTIC LONG-TERM USE: Status: ACTIVE | Noted: 2019-02-12

## 2024-05-12 PROBLEM — I21.9 MI (MYOCARDIAL INFARCTION): Status: ACTIVE | Noted: 2017-12-01

## 2024-05-12 PROBLEM — I71.20 THORACIC AORTIC ANEURYSM: Status: ACTIVE | Noted: 2018-04-16

## 2024-05-12 PROBLEM — I25.10 ATHEROSCLEROSIS OF CORONARY ARTERY: Status: ACTIVE | Noted: 2017-12-06

## 2024-05-13 ENCOUNTER — RX CHANGE (OUTPATIENT)
Age: 64
End: 2024-05-13

## 2024-05-13 ENCOUNTER — NON-APPOINTMENT (OUTPATIENT)
Age: 64
End: 2024-05-13

## 2024-05-13 ENCOUNTER — APPOINTMENT (OUTPATIENT)
Dept: CARDIOLOGY | Facility: CLINIC | Age: 64
End: 2024-05-13
Payer: MEDICARE

## 2024-05-13 VITALS
SYSTOLIC BLOOD PRESSURE: 114 MMHG | OXYGEN SATURATION: 98 % | HEIGHT: 70.5 IN | HEART RATE: 97 BPM | BODY MASS INDEX: 28.03 KG/M2 | DIASTOLIC BLOOD PRESSURE: 80 MMHG | WEIGHT: 198 LBS

## 2024-05-13 DIAGNOSIS — Z79.02 LONG TERM (CURRENT) USE OF ANTITHROMBOTICS/ANTIPLATELETS: ICD-10-CM

## 2024-05-13 DIAGNOSIS — I71.20 THORACIC AORTIC ANEURYSM, WITHOUT RUPTURE, UNSPECIFIED: ICD-10-CM

## 2024-05-13 DIAGNOSIS — I25.10 ATHEROSCLEROTIC HEART DISEASE OF NATIVE CORONARY ARTERY W/OUT ANGINA PECTORIS: ICD-10-CM

## 2024-05-13 DIAGNOSIS — I21.9 ACUTE MYOCARDIAL INFARCTION, UNSPECIFIED: ICD-10-CM

## 2024-05-13 DIAGNOSIS — I10 ESSENTIAL (PRIMARY) HYPERTENSION: ICD-10-CM

## 2024-05-13 DIAGNOSIS — T82.855A STENOSIS OF CORONARY ARTERY STENT, INITIAL ENCOUNTER: ICD-10-CM

## 2024-05-13 DIAGNOSIS — Z87.891 PERSONAL HISTORY OF NICOTINE DEPENDENCE: ICD-10-CM

## 2024-05-13 DIAGNOSIS — E78.5 HYPERLIPIDEMIA, UNSPECIFIED: ICD-10-CM

## 2024-05-13 DIAGNOSIS — I25.10 STENOSIS OF CORONARY ARTERY STENT, INITIAL ENCOUNTER: ICD-10-CM

## 2024-05-13 DIAGNOSIS — I73.9 PERIPHERAL VASCULAR DISEASE, UNSPECIFIED: ICD-10-CM

## 2024-05-13 PROCEDURE — 99214 OFFICE O/P EST MOD 30 MIN: CPT

## 2024-05-13 RX ORDER — EZETIMIBE 10 MG/1
10 TABLET ORAL DAILY
Qty: 90 | Refills: 1 | Status: DISCONTINUED | COMMUNITY
Start: 2020-09-17 | End: 2024-05-13

## 2024-05-13 RX ORDER — BEMPEDOIC ACID AND EZETIMIBE 180; 10 MG/1; MG/1
180-10 TABLET, FILM COATED ORAL DAILY
Qty: 90 | Refills: 3 | Status: ACTIVE | COMMUNITY
Start: 2024-05-13 | End: 1900-01-01

## 2024-05-13 NOTE — HISTORY OF PRESENT ILLNESS
[FreeTextEntry1] : 64 year old male with a past medical history of obstructive CAD complex PCI mRCA 10/27/22 laser and brachytherapy, COPD, CKD, Depression. GERD. Chronic back pain 2/2 spinal stenosis. Aortic dilation. BPH. prior lad/diag stents.   5/2024 VISIT: not exercising as much. LDL is uncontrolled 88. Echo ascending aorta 4.6 cm. Cr 1.31. no AAA.  3/2024 VISIT: no angina. ekg unchanged. bp better. +weight gain unfortunately.   2/2024 VISIT: had an add on visit for chest pain. this has resolved since. LDL 85.   12/2023 VISIT: interim underwent surgery uncomplicated. lost weight. corrected hiatal hernia. reports when laying down having tightness right chest better with SL NTG. breathing much improved.   8/28/23 VISIT: diagnostic coronary angiogram mild isr rca otherwise no significant disease. ifr showed no hemodynamic significance. increasing praluent. he underwent his gi evaluation endoscopy etc. back on brilinta. no issues.    8/2023 VISIT: pending gi work up. 2 weeks ago had resting right chest "grabbing" resolved with NTG. no other recurrence.  cr 1.63   7/2023 VISIT: no further "heart burn." Trying to get further cardiac rehab approved.  Feels dizzy.  Mild tachycardia on beta-blocker.  on imdur 120. fatigue is improved after stopping beta blocker. +weight loss. Has mild improvement in his creatinine overall.  LDL uncontrolled 120. right hip flexor hernia from rowing.   6/2023 VISIT: interim multiple issues: fatigue, epigastric pain. randomly has heart burning twice in past 2 weeks lasting a few seconds. reports this started when on metoprolol. there is no exertional component. on praluent, off statin. is awaiting to restart cardiac rehab.   5/2023 VISIT: main concern is memory changes since 2019 and word finding difficulties.  interim testing labwork: cbc, lft, cr 1.75. was dehydrated.  nuclear no perfusion issue. 6 METS abd u/s no organ pathology carotid doppler no significant disease but some calcified structure anterior to r cca  echo: preserved ef. normal diastology. mild vhd 7 day zio average 73 bpm, no sig rhythm disturbance   4/2023 VISIT: ER 4/2023 with low blood pressure. Cardiology was consulted for hypotension. The patient was at cardiac rehab when he finished his time on treadmill and VS were checked. They noted his HR was elevated and SBP 90's patient said they rechecked twice with no improvement. RRT called because patient was weak. Was given IVF and BP improved. Olmesartan dc'ed. Signed out AMA. Presents today for BP evaluation/HFU. Denies Chest pain. There is some intermittent SOB, occasional flutters, and dizziness and lightheadedness. Feels better off of the Olmesartan.  11/28/2022: underwent brachytherapy RCA. doing well. ekg unchanged. surveillance pending. compliant DAPT.   10/2022: CAD with recent unstable angina due to mid RCA ISR mechanism underexpansion with minimal luminal gain after high-pressure noncompliant ballooning and scoring balloon necessitating laser atherectomy. Expansion was limited by fibrotic tissue.  He had laser/shockwave/cutting 10/27/22 with good result of mid RCA ISR. He was to be set up for brachytherapy within a month at [place].  ** TESTING I REVIEWED TODAY excluding above: 5/2023  labwork: cbc, lft, cr 1.75. was dehydrated.  nuclear no perfusion issue. 6 METS abd u/s no organ pathology carotid doppler no significant disease but some calcified structure anterior to r cca  echo: preserved ef. normal diastology. mild vhd 7 day zio average 73 bpm, no sig rhythm disturbance   Labs 4/2023: Na 139, K 3.9, Cr 1.75, Ca 9.4, Mag 2, AST 92, ALT 53, Trops < 0.010 x 3, BNP < 5, WBC 7.78, Hgb 13.7, HCT 41.9, plt 265,   CT chest/abd/pelvis w or without contrast 4/7/23: No acute pathology in the chest, abd, or pelvis. Other stable chronic findings with mild ectasia of the ascending thoracid aorta and main pulmonary artery enlargement. Recommend CXR correlation and follow up to exclude developing pneumonic process at the right base.  CXR 4/7/23: No acute cardiopulmonary dz.   EKG 4/7/23: SR at 84 bpm, RP interval 164 ms, QTc 453, LAD, PRWP, nonspecific ST-T wave abnormalities   Labs 1/25/23: Trigs 125, HDL 42, LDL 39, Chol 106, TSH 2.33.  [facility] 10/18/21: Lexiscan. Negative.

## 2024-05-13 NOTE — DISCUSSION/SUMMARY
[FreeTextEntry1] : 64 year old male with a past medical history of obstructive CAD complex PCI mRCA 10/27/22 laser and brachytherapy, COPD, CKD, Depression. GERD. Chronic back pain 2/2 spinal stenosis. Aortic dilation. BPH. prior lad/diag stents.   No unstable signs/symptoms. Breathing better after GI surgery. Maintain anti anginal therapy. He is intolerant to beta blocker due to fatigue. Fatigue has not gotten better I think LINDSAY could play a role.   Maintain PCSK9i. Given LDL not at goal, we will switch zetia to nexlizet. DAPT indefinitely.   For the aorta dilation, following with CTS for aorta 4.8 cm. Will obtain CTA aorta yearly (end of 2024).   # CKD: serial labs.  Follow in 6 months with CTA ER precautions given to patient.

## 2024-06-20 NOTE — DISCUSSION/SUMMARY
[FreeTextEntry1] : 63 year old male with a past medical history of obstructive CAD complex PCI mRCA 10/27/22 laser and brachytherapy, COPD, CKD, Depression. GERD. Chronic back pain 2/2 spinal stenosis. Aortic dilation. BPH. prior lad/diag stents.   # Pre operative cv evaluation for cataract surgery: non obstructive CAD recently. Chest pain resolved after hiatal hernia repair.  - No further cardiac testing required prior to procedure. Continue cardiovascular medications as tolerated lakhwinder-procedurally and we prefer aspirin/brilinta. If unable to do it on both, then please continue aspirin 81 throughout while holding brilinta up to 7 days prior and 1 day post if no bleeding. Patient is not at a prohibitive risk for AMI or CHF lakhwinder-procedurally.   # CAD with complex multivessel PCI last 10/2022: no unstable signs/symptoms. breathing better after GI surgery.  - anti anginal therapy: CCB and nitrate; he is intolerant to beta blocker due to fatigue. - praluent 150 q2w and recheck labs  - dapt long term as tolerated given multiple stents, can consider aspirin/plavix in future.   # Thoracic aorta dilation 4.3 cm.  - yearly echo and AAA u/s next 5/2024   # CKD: serial labs.  Follow as scheduled in 2 months with testing. ER precautions given to patient.  [Time Spent: ___ minutes] : I have spent [unfilled] minutes of time on the encounter.

## 2024-07-24 ENCOUNTER — APPOINTMENT (OUTPATIENT)
Dept: CARDIOLOGY | Facility: CLINIC | Age: 64
End: 2024-07-24
Payer: MEDICARE

## 2024-07-24 VITALS
HEIGHT: 70 IN | HEART RATE: 103 BPM | DIASTOLIC BLOOD PRESSURE: 70 MMHG | OXYGEN SATURATION: 97 % | WEIGHT: 203 LBS | SYSTOLIC BLOOD PRESSURE: 148 MMHG | BODY MASS INDEX: 29.06 KG/M2

## 2024-07-24 DIAGNOSIS — Z01.810 ENCOUNTER FOR PREPROCEDURAL CARDIOVASCULAR EXAMINATION: ICD-10-CM

## 2024-07-24 DIAGNOSIS — E78.5 HYPERLIPIDEMIA, UNSPECIFIED: ICD-10-CM

## 2024-07-24 DIAGNOSIS — T82.855A STENOSIS OF CORONARY ARTERY STENT, INITIAL ENCOUNTER: ICD-10-CM

## 2024-07-24 DIAGNOSIS — I10 ESSENTIAL (PRIMARY) HYPERTENSION: ICD-10-CM

## 2024-07-24 PROCEDURE — 99214 OFFICE O/P EST MOD 30 MIN: CPT

## 2024-07-24 PROCEDURE — 93000 ELECTROCARDIOGRAM COMPLETE: CPT

## 2024-07-24 NOTE — DISCUSSION/SUMMARY
[FreeTextEntry1] : 64 year old male with a past medical history of obstructive CAD complex PCI mRCA 10/27/22 laser and brachytherapy, COPD, CKD, Depression. GERD. Chronic back pain 2/2 spinal stenosis. Aortic dilation. BPH. prior lad/diag stents.   No unstable signs/symptoms. Breathing better after GI surgery. Maintain anti anginal therapy. He is intolerant to beta blocker due to fatigue. Fatigue has not gotten better I think LINDSAY could play a role.   Maintain PCSK9i. Given LDL not at goal, at last visit zetia changes to nexlizet. DAPT indefinitely.   For the aorta dilation, following with CTS for aorta 4.8 cm. Will obtain CTA aorta yearly (end of 2024).   CKD: serial labs.  Preoperative status for colonoscopy At present, there are no active cardiac conditions.  No recent unstable coronary syndromes, decompensated heart failure, severe valvular heart disease or significant dysrhythmias.   Baseline functional status is acceptable The clinical benefit of the proposed procedure outweighs the associated cardiovascular risk.   Risk not attenuated with further CV testing.   Prior testing as outlined above. Optimized from a cardiovascular perspective. Minimize time off Brilinta, hold for 5 days prior to procedure and remain ON ASA Continue beta blocker  Red flag symptoms which would warrant sooner emergent evaluation reviewed with the patient.  Questions and concerns were addressed and answered.  OV with Dr. Chan in 1 month  Sincerely,  Valentina Caldera PA-C Patients history, testing and plan reviewed with supervising MD: Dr. Patrick Valentino  Reviewed with Dr. Chan over the phone

## 2024-07-24 NOTE — HISTORY OF PRESENT ILLNESS
[FreeTextEntry1] : 64 year old male with a past medical history of obstructive CAD complex PCI mRCA 10/27/22 laser and brachytherapy, COPD, CKD, Depression. GERD. Chronic back pain 2/2 spinal stenosis. Aortic dilation. BPH. prior lad/diag stents.  Presents to the office today for preoperative clearance for elective colonoscopy. There has been nausea and +cologuard test.  Active with no new exertional complaints. No recent use of Ntg. Medications are unchanged. Today he denies chest pain, pressure, unusual shortness of breath, lightheadedness, dizziness, near syncope or syncope.   Overall he has been feeling well. There has been no recent illness or hospital stay. Medications have remained unchanged. Asymptomatic from cardiovascular and arrhythmia standpoint.   5/2024 VISIT: not exercising as much. LDL is uncontrolled 88. Echo ascending aorta 4.6 cm. Cr 1.31. no AAA.  3/2024 VISIT: no angina. ekg unchanged. bp better. +weight gain unfortunately.   2/2024 VISIT: had an add on visit for chest pain. this has resolved since. LDL 85.   12/2023 VISIT: interim underwent surgery uncomplicated. lost weight. corrected hiatal hernia. reports when laying down having tightness right chest better with SL NTG. breathing much improved.   8/28/23 VISIT: diagnostic coronary angiogram mild isr rca otherwise no significant disease. ifr showed no hemodynamic significance. increasing praluent. he underwent his gi evaluation endoscopy etc. back on brilinta. no issues.    8/2023 VISIT: pending gi work up. 2 weeks ago had resting right chest "grabbing" resolved with NTG. no other recurrence.  cr 1.63   7/2023 VISIT: no further "heart burn." Trying to get further cardiac rehab approved.  Feels dizzy.  Mild tachycardia on beta-blocker.  on imdur 120. fatigue is improved after stopping beta blocker. +weight loss. Has mild improvement in his creatinine overall.  LDL uncontrolled 120. right hip flexor hernia from rowing.   6/2023 VISIT: interim multiple issues: fatigue, epigastric pain. randomly has heart burning twice in past 2 weeks lasting a few seconds. reports this started when on metoprolol. there is no exertional component. on praluent, off statin. is awaiting to restart cardiac rehab.   5/2023 VISIT: main concern is memory changes since 2019 and word finding difficulties.  interim testing labwork: cbc, lft, cr 1.75. was dehydrated.  nuclear no perfusion issue. 6 METS abd u/s no organ pathology carotid doppler no significant disease but some calcified structure anterior to r cca  echo: preserved ef. normal diastology. mild vhd 7 day zio average 73 bpm, no sig rhythm disturbance   4/2023 VISIT: ER 4/2023 with low blood pressure. Cardiology was consulted for hypotension. The patient was at cardiac rehab when he finished his time on treadmill and VS were checked. They noted his HR was elevated and SBP 90's patient said they rechecked twice with no improvement. RRT called because patient was weak. Was given IVF and BP improved. Olmesartan dc'ed. Signed out AMA. Presents today for BP evaluation/HFU. Denies Chest pain. There is some intermittent SOB, occasional flutters, and dizziness and lightheadedness. Feels better off of the Olmesartan.  11/28/2022: underwent brachytherapy RCA. doing well. ekg unchanged. surveillance pending. compliant DAPT.   10/2022: CAD with recent unstable angina due to mid RCA ISR mechanism underexpansion with minimal luminal gain after high-pressure noncompliant ballooning and scoring balloon necessitating laser atherectomy. Expansion was limited by fibrotic tissue.  He had laser/shockwave/cutting 10/27/22 with good result of mid RCA ISR. He was to be set up for brachytherapy within a month at [place].  ** TESTING I REVIEWED TODAY excluding above: 5/2023  labwork: cbc, lft, cr 1.75. was dehydrated.  nuclear no perfusion issue. 6 METS abd u/s no organ pathology carotid doppler no significant disease but some calcified structure anterior to r cca  echo: preserved ef. normal diastology. mild vhd 7 day zio average 73 bpm, no sig rhythm disturbance   Labs 4/2023: Na 139, K 3.9, Cr 1.75, Ca 9.4, Mag 2, AST 92, ALT 53, Trops < 0.010 x 3, BNP < 5, WBC 7.78, Hgb 13.7, HCT 41.9, plt 265,   CT chest/abd/pelvis w or without contrast 4/7/23: No acute pathology in the chest, abd, or pelvis. Other stable chronic findings with mild ectasia of the ascending thoracid aorta and main pulmonary artery enlargement. Recommend CXR correlation and follow up to exclude developing pneumonic process at the right base.  CXR 4/7/23: No acute cardiopulmonary dz.   EKG 4/7/23: SR at 84 bpm, RP interval 164 ms, QTc 453, LAD, PRWP, nonspecific ST-T wave abnormalities   Labs 1/25/23: Trigs 125, HDL 42, LDL 39, Chol 106, TSH 2.33.  [facility] 10/18/21: Lexiscan. Negative. 
[FreeTextEntry1] : 64 year old male with a past medical history of obstructive CAD complex PCI mRCA 10/27/22 laser and brachytherapy, COPD, CKD, Depression. GERD. Chronic back pain 2/2 spinal stenosis. Aortic dilation. BPH. prior lad/diag stents.  Presents to the office today for preoperative clearance for elective colonoscopy. There has been nausea and +cologuard test.  Active with no new exertional complaints. No recent use of Ntg. Medications are unchanged. Today he denies chest pain, pressure, unusual shortness of breath, lightheadedness, dizziness, near syncope or syncope.   Overall he has been feeling well. There has been no recent illness or hospital stay. Medications have remained unchanged. Asymptomatic from cardiovascular and arrhythmia standpoint.   5/2024 VISIT: not exercising as much. LDL is uncontrolled 88. Echo ascending aorta 4.6 cm. Cr 1.31. no AAA.  3/2024 VISIT: no angina. ekg unchanged. bp better. +weight gain unfortunately.   2/2024 VISIT: had an add on visit for chest pain. this has resolved since. LDL 85.   12/2023 VISIT: interim underwent surgery uncomplicated. lost weight. corrected hiatal hernia. reports when laying down having tightness right chest better with SL NTG. breathing much improved.   8/28/23 VISIT: diagnostic coronary angiogram mild isr rca otherwise no significant disease. ifr showed no hemodynamic significance. increasing praluent. he underwent his gi evaluation endoscopy etc. back on brilinta. no issues.    8/2023 VISIT: pending gi work up. 2 weeks ago had resting right chest "grabbing" resolved with NTG. no other recurrence.  cr 1.63   7/2023 VISIT: no further "heart burn." Trying to get further cardiac rehab approved.  Feels dizzy.  Mild tachycardia on beta-blocker.  on imdur 120. fatigue is improved after stopping beta blocker. +weight loss. Has mild improvement in his creatinine overall.  LDL uncontrolled 120. right hip flexor hernia from rowing.   6/2023 VISIT: interim multiple issues: fatigue, epigastric pain. randomly has heart burning twice in past 2 weeks lasting a few seconds. reports this started when on metoprolol. there is no exertional component. on praluent, off statin. is awaiting to restart cardiac rehab.   5/2023 VISIT: main concern is memory changes since 2019 and word finding difficulties.  interim testing labwork: cbc, lft, cr 1.75. was dehydrated.  nuclear no perfusion issue. 6 METS abd u/s no organ pathology carotid doppler no significant disease but some calcified structure anterior to r cca  echo: preserved ef. normal diastology. mild vhd 7 day zio average 73 bpm, no sig rhythm disturbance   4/2023 VISIT: ER 4/2023 with low blood pressure. Cardiology was consulted for hypotension. The patient was at cardiac rehab when he finished his time on treadmill and VS were checked. They noted his HR was elevated and SBP 90's patient said they rechecked twice with no improvement. RRT called because patient was weak. Was given IVF and BP improved. Olmesartan dc'ed. Signed out AMA. Presents today for BP evaluation/HFU. Denies Chest pain. There is some intermittent SOB, occasional flutters, and dizziness and lightheadedness. Feels better off of the Olmesartan.  11/28/2022: underwent brachytherapy RCA. doing well. ekg unchanged. surveillance pending. compliant DAPT.   10/2022: CAD with recent unstable angina due to mid RCA ISR mechanism underexpansion with minimal luminal gain after high-pressure noncompliant ballooning and scoring balloon necessitating laser atherectomy. Expansion was limited by fibrotic tissue.  He had laser/shockwave/cutting 10/27/22 with good result of mid RCA ISR. He was to be set up for brachytherapy within a month at [place].  ** TESTING I REVIEWED TODAY excluding above: 5/2023  labwork: cbc, lft, cr 1.75. was dehydrated.  nuclear no perfusion issue. 6 METS abd u/s no organ pathology carotid doppler no significant disease but some calcified structure anterior to r cca  echo: preserved ef. normal diastology. mild vhd 7 day zio average 73 bpm, no sig rhythm disturbance   Labs 4/2023: Na 139, K 3.9, Cr 1.75, Ca 9.4, Mag 2, AST 92, ALT 53, Trops < 0.010 x 3, BNP < 5, WBC 7.78, Hgb 13.7, HCT 41.9, plt 265,   CT chest/abd/pelvis w or without contrast 4/7/23: No acute pathology in the chest, abd, or pelvis. Other stable chronic findings with mild ectasia of the ascending thoracid aorta and main pulmonary artery enlargement. Recommend CXR correlation and follow up to exclude developing pneumonic process at the right base.  CXR 4/7/23: No acute cardiopulmonary dz.   EKG 4/7/23: SR at 84 bpm, RP interval 164 ms, QTc 453, LAD, PRWP, nonspecific ST-T wave abnormalities   Labs 1/25/23: Trigs 125, HDL 42, LDL 39, Chol 106, TSH 2.33.  [facility] 10/18/21: Lexiscan. Negative. 
Bacterial Etiology

## 2024-08-20 NOTE — DISCUSSION/SUMMARY
[FreeTextEntry1] : 64 year old male with a past medical history of obstructive CAD complex PCI mRCA 10/27/22 laser and brachytherapy, COPD, CKD, Depression. GERD. Chronic back pain 2/2 spinal stenosis. Aortic dilation. BPH. prior lad/diag stents.   No unstable signs/symptoms. Breathing better after GI surgery. Maintain anti anginal therapy. He is intolerant to beta blocker due to fatigue. Fatigue has not gotten better I think LINDSAY could play a role.   Maintain PCSK9i. Given LDL not at goal, we will switch zetia to nexlizet. DAPT indefinitely.   For the aorta dilation, following with CTS for aorta 4.8 cm. Will obtain CTA aorta yearly (end of 2024).   # CKD: serial labs.  Follow in 3 months with CTA. ER precautions given to patient.

## 2024-08-20 NOTE — HISTORY OF PRESENT ILLNESS
[FreeTextEntry1] : 64 year old male with a past medical history of obstructive CAD complex PCI mRCA 10/27/22 laser and brachytherapy, COPD, CKD, Depression. GERD. Chronic back pain 2/2 spinal stenosis. Aortic dilation. BPH. prior lad/diag stents.   8/2024 VISIT:  approved for nexlizet  5/2024 VISIT: not exercising as much. LDL is uncontrolled 88. Echo ascending aorta 4.6 cm. Cr 1.31. no AAA.  3/2024 VISIT: no angina. ekg unchanged. bp better. +weight gain unfortunately.   2/2024 VISIT: had an add on visit for chest pain. this has resolved since. LDL 85.   12/2023 VISIT: interim underwent surgery uncomplicated. lost weight. corrected hiatal hernia. reports when laying down having tightness right chest better with SL NTG. breathing much improved.   8/28/23 VISIT: diagnostic coronary angiogram mild isr rca otherwise no significant disease. ifr showed no hemodynamic significance. increasing praluent. he underwent his gi evaluation endoscopy etc. back on brilinta. no issues.    8/2023 VISIT: pending gi work up. 2 weeks ago had resting right chest "grabbing" resolved with NTG. no other recurrence.  cr 1.63   7/2023 VISIT: no further "heart burn." Trying to get further cardiac rehab approved.  Feels dizzy.  Mild tachycardia on beta-blocker.  on imdur 120. fatigue is improved after stopping beta blocker. +weight loss. Has mild improvement in his creatinine overall.  LDL uncontrolled 120. right hip flexor hernia from rowing.   6/2023 VISIT: interim multiple issues: fatigue, epigastric pain. randomly has heart burning twice in past 2 weeks lasting a few seconds. reports this started when on metoprolol. there is no exertional component. on praluent, off statin. is awaiting to restart cardiac rehab.   5/2023 VISIT: main concern is memory changes since 2019 and word finding difficulties.  interim testing labwork: cbc, lft, cr 1.75. was dehydrated.  nuclear no perfusion issue. 6 METS abd u/s no organ pathology carotid doppler no significant disease but some calcified structure anterior to r cca  echo: preserved ef. normal diastology. mild vhd 7 day zio average 73 bpm, no sig rhythm disturbance   4/2023 VISIT: ER 4/2023 with low blood pressure. Cardiology was consulted for hypotension. The patient was at cardiac rehab when he finished his time on treadmill and VS were checked. They noted his HR was elevated and SBP 90's patient said they rechecked twice with no improvement. RRT called because patient was weak. Was given IVF and BP improved. Olmesartan dc'ed. Signed out AMA. Presents today for BP evaluation/HFU. Denies Chest pain. There is some intermittent SOB, occasional flutters, and dizziness and lightheadedness. Feels better off of the Olmesartan.  11/28/2022: underwent brachytherapy RCA. doing well. ekg unchanged. surveillance pending. compliant DAPT.   10/2022: CAD with recent unstable angina due to mid RCA ISR mechanism underexpansion with minimal luminal gain after high-pressure noncompliant ballooning and scoring balloon necessitating laser atherectomy. Expansion was limited by fibrotic tissue.  He had laser/shockwave/cutting 10/27/22 with good result of mid RCA ISR. He was to be set up for brachytherapy within a month at [place].  **TESTING I REVIEWED TODAY excluding above: 5/2023  labwork: cbc, lft, cr 1.75. was dehydrated.  nuclear no perfusion issue. 6 METS abd u/s no organ pathology carotid doppler no significant disease but some calcified structure anterior to r cca  echo: preserved ef. normal diastology. mild vhd 7 day zio average 73 bpm, no sig rhythm disturbance   Labs 4/2023: Na 139, K 3.9, Cr 1.75, Ca 9.4, Mag 2, AST 92, ALT 53, Trops < 0.010 x 3, BNP < 5, WBC 7.78, Hgb 13.7, HCT 41.9, plt 265,   CT chest/abd/pelvis w or without contrast 4/7/23: No acute pathology in the chest, abd, or pelvis. Other stable chronic findings with mild ectasia of the ascending thoracid aorta and main pulmonary artery enlargement. Recommend CXR correlation and follow up to exclude developing pneumonic process at the right base.  CXR 4/7/23: No acute cardiopulmonary dz.   EKG 4/7/23: SR at 84 bpm, RP interval 164 ms, QTc 453, LAD, PRWP, nonspecific ST-T wave abnormalities   Labs 1/25/23: Trigs 125, HDL 42, LDL 39, Chol 106, TSH 2.33.  [facility] 10/18/21: Lexiscan. Negative.

## 2024-08-26 ENCOUNTER — APPOINTMENT (OUTPATIENT)
Dept: CARDIOLOGY | Facility: CLINIC | Age: 64
End: 2024-08-26

## 2024-09-09 ENCOUNTER — APPOINTMENT (OUTPATIENT)
Dept: CARDIOLOGY | Facility: CLINIC | Age: 64
End: 2024-09-09
Payer: MEDICARE

## 2024-09-09 VITALS
DIASTOLIC BLOOD PRESSURE: 82 MMHG | OXYGEN SATURATION: 97 % | SYSTOLIC BLOOD PRESSURE: 134 MMHG | WEIGHT: 195 LBS | BODY MASS INDEX: 27.98 KG/M2 | HEART RATE: 102 BPM

## 2024-09-09 DIAGNOSIS — I71.20 THORACIC AORTIC ANEURYSM, WITHOUT RUPTURE, UNSPECIFIED: ICD-10-CM

## 2024-09-09 DIAGNOSIS — Z79.02 LONG TERM (CURRENT) USE OF ANTITHROMBOTICS/ANTIPLATELETS: ICD-10-CM

## 2024-09-09 DIAGNOSIS — I73.9 PERIPHERAL VASCULAR DISEASE, UNSPECIFIED: ICD-10-CM

## 2024-09-09 DIAGNOSIS — R06.02 SHORTNESS OF BREATH: ICD-10-CM

## 2024-09-09 DIAGNOSIS — I25.10 ATHEROSCLEROTIC HEART DISEASE OF NATIVE CORONARY ARTERY W/OUT ANGINA PECTORIS: ICD-10-CM

## 2024-09-09 PROCEDURE — 99214 OFFICE O/P EST MOD 30 MIN: CPT

## 2024-09-09 NOTE — HISTORY OF PRESENT ILLNESS
[FreeTextEntry1] : 64 year old male with a past medical history of obstructive CAD complex PCI mRCA 10/27/22 laser and brachytherapy, COPD, CKD, Depression. GERD. Chronic back pain 2/2 spinal stenosis. Aortic dilation. BPH. prior lad/diag stents.   9/2024 VISIT: no angina. has not exercised recently.  cr 1.23. normal ck. optimized lipids on nexlizet.   5/2024 VISIT: not exercising as much. LDL is uncontrolled 88. Echo ascending aorta 4.6 cm. Cr 1.31. no AAA.  3/2024 VISIT: no angina. ekg unchanged. bp better. +weight gain unfortunately.   2/2024 VISIT: had an add on visit for chest pain. this has resolved since. LDL 85.   12/2023 VISIT: interim underwent surgery uncomplicated. lost weight. corrected hiatal hernia. reports when laying down having tightness right chest better with SL NTG. breathing much improved.   8/28/23 VISIT: diagnostic coronary angiogram mild isr rca otherwise no significant disease. ifr showed no hemodynamic significance. increasing praluent. he underwent his gi evaluation endoscopy etc. back on brilinta. no issues.    8/2023 VISIT: pending gi work up. 2 weeks ago had resting right chest "grabbing" resolved with NTG. no other recurrence.  cr 1.63   7/2023 VISIT: no further "heart burn." Trying to get further cardiac rehab approved.  Feels dizzy.  Mild tachycardia on beta-blocker.  on imdur 120. fatigue is improved after stopping beta blocker. +weight loss. Has mild improvement in his creatinine overall.  LDL uncontrolled 120. right hip flexor hernia from rowing.   6/2023 VISIT: interim multiple issues: fatigue, epigastric pain. randomly has heart burning twice in past 2 weeks lasting a few seconds. reports this started when on metoprolol. there is no exertional component. on praluent, off statin. is awaiting to restart cardiac rehab.   5/2023 VISIT: main concern is memory changes since 2019 and word finding difficulties.  interim testing labwork: cbc, lft, cr 1.75. was dehydrated.  nuclear no perfusion issue. 6 METS abd u/s no organ pathology carotid doppler no significant disease but some calcified structure anterior to r cca  echo: preserved ef. normal diastology. mild vhd 7 day zio average 73 bpm, no sig rhythm disturbance   4/2023 VISIT: ER 4/2023 with low blood pressure. Cardiology was consulted for hypotension. The patient was at cardiac rehab when he finished his time on treadmill and VS were checked. They noted his HR was elevated and SBP 90's patient said they rechecked twice with no improvement. RRT called because patient was weak. Was given IVF and BP improved. Olmesartan dc'ed. Signed out AMA. Presents today for BP evaluation/HFU. Denies Chest pain. There is some intermittent SOB, occasional flutters, and dizziness and lightheadedness. Feels better off of the Olmesartan.  11/28/2022: underwent brachytherapy RCA. doing well. ekg unchanged. surveillance pending. compliant DAPT.   10/2022: CAD with recent unstable angina due to mid RCA ISR mechanism underexpansion with minimal luminal gain after high-pressure noncompliant ballooning and scoring balloon necessitating laser atherectomy. Expansion was limited by fibrotic tissue.  He had laser/shockwave/cutting 10/27/22 with good result of mid RCA ISR. He was to be set up for brachytherapy within a month at [place].  **TESTING I REVIEWED TODAY excluding above: 5/2023  labwork: cbc, lft, cr 1.75. was dehydrated.  nuclear no perfusion issue. 6 METS abd u/s no organ pathology carotid doppler no significant disease but some calcified structure anterior to r cca  echo: preserved ef. normal diastology. mild vhd 7 day zio average 73 bpm, no sig rhythm disturbance   Labs 4/2023: Na 139, K 3.9, Cr 1.75, Ca 9.4, Mag 2, AST 92, ALT 53, Trops < 0.010 x 3, BNP < 5, WBC 7.78, Hgb 13.7, HCT 41.9, plt 265,   CT chest/abd/pelvis w or without contrast 4/7/23: No acute pathology in the chest, abd, or pelvis. Other stable chronic findings with mild ectasia of the ascending thoracid aorta and main pulmonary artery enlargement. Recommend CXR correlation and follow up to exclude developing pneumonic process at the right base.  CXR 4/7/23: No acute cardiopulmonary dz.   EKG 4/7/23: SR at 84 bpm, RP interval 164 ms, QTc 453, LAD, PRWP, nonspecific ST-T wave abnormalities   Labs 1/25/23: Trigs 125, HDL 42, LDL 39, Chol 106, TSH 2.33.  [facility] 10/18/21: Lexiscan. Negative. 
[FreeTextEntry1] : 64 year old male with a past medical history of obstructive CAD complex PCI mRCA 10/27/22 laser and brachytherapy, COPD, CKD, Depression. GERD. Chronic back pain 2/2 spinal stenosis. Aortic dilation. BPH. prior lad/diag stents.   9/2024 VISIT: no angina. has not exercised recently.  cr 1.23. normal ck. optimized lipids on nexlizet.   5/2024 VISIT: not exercising as much. LDL is uncontrolled 88. Echo ascending aorta 4.6 cm. Cr 1.31. no AAA.  3/2024 VISIT: no angina. ekg unchanged. bp better. +weight gain unfortunately.   2/2024 VISIT: had an add on visit for chest pain. this has resolved since. LDL 85.   12/2023 VISIT: interim underwent surgery uncomplicated. lost weight. corrected hiatal hernia. reports when laying down having tightness right chest better with SL NTG. breathing much improved.   8/28/23 VISIT: diagnostic coronary angiogram mild isr rca otherwise no significant disease. ifr showed no hemodynamic significance. increasing praluent. he underwent his gi evaluation endoscopy etc. back on brilinta. no issues.    8/2023 VISIT: pending gi work up. 2 weeks ago had resting right chest "grabbing" resolved with NTG. no other recurrence.  cr 1.63   7/2023 VISIT: no further "heart burn." Trying to get further cardiac rehab approved.  Feels dizzy.  Mild tachycardia on beta-blocker.  on imdur 120. fatigue is improved after stopping beta blocker. +weight loss. Has mild improvement in his creatinine overall.  LDL uncontrolled 120. right hip flexor hernia from rowing.   6/2023 VISIT: interim multiple issues: fatigue, epigastric pain. randomly has heart burning twice in past 2 weeks lasting a few seconds. reports this started when on metoprolol. there is no exertional component. on praluent, off statin. is awaiting to restart cardiac rehab.   5/2023 VISIT: main concern is memory changes since 2019 and word finding difficulties.  interim testing labwork: cbc, lft, cr 1.75. was dehydrated.  nuclear no perfusion issue. 6 METS abd u/s no organ pathology carotid doppler no significant disease but some calcified structure anterior to r cca  echo: preserved ef. normal diastology. mild vhd 7 day zio average 73 bpm, no sig rhythm disturbance   4/2023 VISIT: ER 4/2023 with low blood pressure. Cardiology was consulted for hypotension. The patient was at cardiac rehab when he finished his time on treadmill and VS were checked. They noted his HR was elevated and SBP 90's patient said they rechecked twice with no improvement. RRT called because patient was weak. Was given IVF and BP improved. Olmesartan dc'ed. Signed out AMA. Presents today for BP evaluation/HFU. Denies Chest pain. There is some intermittent SOB, occasional flutters, and dizziness and lightheadedness. Feels better off of the Olmesartan.  11/28/2022: underwent brachytherapy RCA. doing well. ekg unchanged. surveillance pending. compliant DAPT.   10/2022: CAD with recent unstable angina due to mid RCA ISR mechanism underexpansion with minimal luminal gain after high-pressure noncompliant ballooning and scoring balloon necessitating laser atherectomy. Expansion was limited by fibrotic tissue.  He had laser/shockwave/cutting 10/27/22 with good result of mid RCA ISR. He was to be set up for brachytherapy within a month at [place].  **TESTING I REVIEWED TODAY excluding above: 5/2023  labwork: cbc, lft, cr 1.75. was dehydrated.  nuclear no perfusion issue. 6 METS abd u/s no organ pathology carotid doppler no significant disease but some calcified structure anterior to r cca  echo: preserved ef. normal diastology. mild vhd 7 day zio average 73 bpm, no sig rhythm disturbance   Labs 4/2023: Na 139, K 3.9, Cr 1.75, Ca 9.4, Mag 2, AST 92, ALT 53, Trops < 0.010 x 3, BNP < 5, WBC 7.78, Hgb 13.7, HCT 41.9, plt 265,   CT chest/abd/pelvis w or without contrast 4/7/23: No acute pathology in the chest, abd, or pelvis. Other stable chronic findings with mild ectasia of the ascending thoracid aorta and main pulmonary artery enlargement. Recommend CXR correlation and follow up to exclude developing pneumonic process at the right base.  CXR 4/7/23: No acute cardiopulmonary dz.   EKG 4/7/23: SR at 84 bpm, RP interval 164 ms, QTc 453, LAD, PRWP, nonspecific ST-T wave abnormalities   Labs 1/25/23: Trigs 125, HDL 42, LDL 39, Chol 106, TSH 2.33.  [facility] 10/18/21: Lexiscan. Negative. 
No

## 2024-09-09 NOTE — DISCUSSION/SUMMARY
[FreeTextEntry1] : 64 year old male with a past medical history of obstructive CAD complex PCI mRCA 10/27/22 laser and brachytherapy, COPD, CKD, Depression. GERD. Chronic back pain 2/2 spinal stenosis. Aortic dilation. BPH. prior lad/diag stents.   No unstable signs/symptoms. Maintain anti anginal therapy. He is intolerant to beta blocker due to fatigue. Fatigue has not gotten better I think LINDSAY could play a role recommending sleep study.  Maintain PCSK9i. Nexlizet worked tremendously LDL 88 now 24. DAPT indefinitely.   For the aorta dilation, following with CTS for aorta 4.8 cm. Will obtain CTA aorta yearly (end of 2024).   # CKD: serial labs.  Follow in 2-3 months with CTA. ER precautions given to patient.

## 2024-10-28 ENCOUNTER — NON-APPOINTMENT (OUTPATIENT)
Age: 64
End: 2024-10-28

## 2024-11-11 ENCOUNTER — APPOINTMENT (OUTPATIENT)
Dept: CARDIOLOGY | Facility: CLINIC | Age: 64
End: 2024-11-11
Payer: MEDICARE

## 2024-11-11 VITALS
DIASTOLIC BLOOD PRESSURE: 84 MMHG | BODY MASS INDEX: 29.99 KG/M2 | WEIGHT: 209 LBS | OXYGEN SATURATION: 97 % | HEART RATE: 92 BPM | SYSTOLIC BLOOD PRESSURE: 128 MMHG

## 2024-11-11 DIAGNOSIS — Z79.02 LONG TERM (CURRENT) USE OF ANTITHROMBOTICS/ANTIPLATELETS: ICD-10-CM

## 2024-11-11 DIAGNOSIS — Z87.891 PERSONAL HISTORY OF NICOTINE DEPENDENCE: ICD-10-CM

## 2024-11-11 DIAGNOSIS — I21.9 ACUTE MYOCARDIAL INFARCTION, UNSPECIFIED: ICD-10-CM

## 2024-11-11 DIAGNOSIS — I71.20 THORACIC AORTIC ANEURYSM, WITHOUT RUPTURE, UNSPECIFIED: ICD-10-CM

## 2024-11-11 DIAGNOSIS — I10 ESSENTIAL (PRIMARY) HYPERTENSION: ICD-10-CM

## 2024-11-11 DIAGNOSIS — I25.10 STENOSIS OF CORONARY ARTERY STENT, INITIAL ENCOUNTER: ICD-10-CM

## 2024-11-11 DIAGNOSIS — I73.9 PERIPHERAL VASCULAR DISEASE, UNSPECIFIED: ICD-10-CM

## 2024-11-11 DIAGNOSIS — I25.10 ATHEROSCLEROTIC HEART DISEASE OF NATIVE CORONARY ARTERY W/OUT ANGINA PECTORIS: ICD-10-CM

## 2024-11-11 DIAGNOSIS — T82.855A STENOSIS OF CORONARY ARTERY STENT, INITIAL ENCOUNTER: ICD-10-CM

## 2024-11-11 DIAGNOSIS — R06.02 SHORTNESS OF BREATH: ICD-10-CM

## 2024-11-11 PROCEDURE — 99214 OFFICE O/P EST MOD 30 MIN: CPT

## 2025-02-20 ENCOUNTER — RX RENEWAL (OUTPATIENT)
Age: 65
End: 2025-02-20

## 2025-05-12 ENCOUNTER — APPOINTMENT (OUTPATIENT)
Dept: CARDIOLOGY | Facility: CLINIC | Age: 65
End: 2025-05-12

## 2025-05-31 ENCOUNTER — RX RENEWAL (OUTPATIENT)
Age: 65
End: 2025-05-31

## 2025-05-31 ENCOUNTER — NON-APPOINTMENT (OUTPATIENT)
Age: 65
End: 2025-05-31

## 2025-06-02 DIAGNOSIS — T82.855A STENOSIS OF CORONARY ARTERY STENT, INITIAL ENCOUNTER: ICD-10-CM
